# Patient Record
Sex: MALE | Race: WHITE | NOT HISPANIC OR LATINO | URBAN - METROPOLITAN AREA
[De-identification: names, ages, dates, MRNs, and addresses within clinical notes are randomized per-mention and may not be internally consistent; named-entity substitution may affect disease eponyms.]

---

## 2024-04-15 ENCOUNTER — APPOINTMENT (OUTPATIENT)
Dept: LAB | Age: 29
End: 2024-04-15
Payer: MEDICARE

## 2024-04-15 ENCOUNTER — OFFICE VISIT (OUTPATIENT)
Dept: FAMILY MEDICINE CLINIC | Facility: CLINIC | Age: 29
End: 2024-04-15
Payer: MEDICARE

## 2024-04-15 VITALS
BODY MASS INDEX: 39.14 KG/M2 | HEART RATE: 106 BPM | SYSTOLIC BLOOD PRESSURE: 118 MMHG | RESPIRATION RATE: 16 BRPM | WEIGHT: 273.4 LBS | DIASTOLIC BLOOD PRESSURE: 84 MMHG | HEIGHT: 70 IN | OXYGEN SATURATION: 97 % | TEMPERATURE: 98.5 F

## 2024-04-15 DIAGNOSIS — F90.9 ADULT ADHD (ATTENTION DEFICIT HYPERACTIVITY DISORDER): Primary | ICD-10-CM

## 2024-04-15 DIAGNOSIS — Z00.00 HEALTHCARE MAINTENANCE: ICD-10-CM

## 2024-04-15 DIAGNOSIS — R73.9 HYPERGLYCEMIA: ICD-10-CM

## 2024-04-15 DIAGNOSIS — E78.49 OTHER HYPERLIPIDEMIA: ICD-10-CM

## 2024-04-15 LAB
ALBUMIN SERPL BCP-MCNC: 4.3 G/DL (ref 3.5–5)
ALP SERPL-CCNC: 63 U/L (ref 34–104)
ALT SERPL W P-5'-P-CCNC: 28 U/L (ref 7–52)
ANION GAP SERPL CALCULATED.3IONS-SCNC: 10 MMOL/L (ref 4–13)
AST SERPL W P-5'-P-CCNC: 18 U/L (ref 13–39)
BASOPHILS # BLD AUTO: 0.07 THOUSANDS/ÂΜL (ref 0–0.1)
BASOPHILS NFR BLD AUTO: 1 % (ref 0–1)
BILIRUB SERPL-MCNC: 0.45 MG/DL (ref 0.2–1)
BUN SERPL-MCNC: 13 MG/DL (ref 5–25)
CALCIUM SERPL-MCNC: 9.3 MG/DL (ref 8.4–10.2)
CHLORIDE SERPL-SCNC: 104 MMOL/L (ref 96–108)
CHOLEST SERPL-MCNC: 213 MG/DL
CO2 SERPL-SCNC: 26 MMOL/L (ref 21–32)
CREAT SERPL-MCNC: 0.98 MG/DL (ref 0.6–1.3)
EOSINOPHIL # BLD AUTO: 0.3 THOUSAND/ÂΜL (ref 0–0.61)
EOSINOPHIL NFR BLD AUTO: 3 % (ref 0–6)
ERYTHROCYTE [DISTWIDTH] IN BLOOD BY AUTOMATED COUNT: 12.7 % (ref 11.6–15.1)
EST. AVERAGE GLUCOSE BLD GHB EST-MCNC: 131 MG/DL
GFR SERPL CREATININE-BSD FRML MDRD: 103 ML/MIN/1.73SQ M
GLUCOSE P FAST SERPL-MCNC: 88 MG/DL (ref 65–99)
HBA1C MFR BLD: 6.2 %
HCT VFR BLD AUTO: 45.8 % (ref 36.5–49.3)
HDLC SERPL-MCNC: 42 MG/DL
HGB BLD-MCNC: 15.1 G/DL (ref 12–17)
IMM GRANULOCYTES # BLD AUTO: 0.03 THOUSAND/UL (ref 0–0.2)
IMM GRANULOCYTES NFR BLD AUTO: 0 % (ref 0–2)
LDLC SERPL CALC-MCNC: 148 MG/DL (ref 0–100)
LYMPHOCYTES # BLD AUTO: 2.39 THOUSANDS/ÂΜL (ref 0.6–4.47)
LYMPHOCYTES NFR BLD AUTO: 27 % (ref 14–44)
MCH RBC QN AUTO: 29.4 PG (ref 26.8–34.3)
MCHC RBC AUTO-ENTMCNC: 33 G/DL (ref 31.4–37.4)
MCV RBC AUTO: 89 FL (ref 82–98)
MONOCYTES # BLD AUTO: 0.61 THOUSAND/ÂΜL (ref 0.17–1.22)
MONOCYTES NFR BLD AUTO: 7 % (ref 4–12)
NEUTROPHILS # BLD AUTO: 5.32 THOUSANDS/ÂΜL (ref 1.85–7.62)
NEUTS SEG NFR BLD AUTO: 62 % (ref 43–75)
NRBC BLD AUTO-RTO: 0 /100 WBCS
PLATELET # BLD AUTO: 308 THOUSANDS/UL (ref 149–390)
PMV BLD AUTO: 11.8 FL (ref 8.9–12.7)
POTASSIUM SERPL-SCNC: 3.8 MMOL/L (ref 3.5–5.3)
PROT SERPL-MCNC: 7.3 G/DL (ref 6.4–8.4)
RBC # BLD AUTO: 5.13 MILLION/UL (ref 3.88–5.62)
SODIUM SERPL-SCNC: 140 MMOL/L (ref 135–147)
T4 FREE SERPL-MCNC: 0.66 NG/DL (ref 0.61–1.12)
TRIGL SERPL-MCNC: 115 MG/DL
TSH SERPL DL<=0.05 MIU/L-ACNC: 6.99 UIU/ML (ref 0.45–4.5)
WBC # BLD AUTO: 8.72 THOUSAND/UL (ref 4.31–10.16)

## 2024-04-15 PROCEDURE — 84443 ASSAY THYROID STIM HORMONE: CPT

## 2024-04-15 PROCEDURE — 80053 COMPREHEN METABOLIC PANEL: CPT

## 2024-04-15 PROCEDURE — 80061 LIPID PANEL: CPT

## 2024-04-15 PROCEDURE — 99204 OFFICE O/P NEW MOD 45 MIN: CPT | Performed by: FAMILY MEDICINE

## 2024-04-15 PROCEDURE — 85025 COMPLETE CBC W/AUTO DIFF WBC: CPT

## 2024-04-15 PROCEDURE — 83036 HEMOGLOBIN GLYCOSYLATED A1C: CPT

## 2024-04-15 PROCEDURE — 36415 COLL VENOUS BLD VENIPUNCTURE: CPT

## 2024-04-15 PROCEDURE — 84439 ASSAY OF FREE THYROXINE: CPT

## 2024-04-15 PROCEDURE — 99395 PREV VISIT EST AGE 18-39: CPT | Performed by: FAMILY MEDICINE

## 2024-04-15 RX ORDER — DEXTROAMPHETAMINE SACCHARATE, AMPHETAMINE ASPARTATE, DEXTROAMPHETAMINE SULFATE AND AMPHETAMINE SULFATE 3.75; 3.75; 3.75; 3.75 MG/1; MG/1; MG/1; MG/1
15 TABLET ORAL DAILY
Qty: 30 TABLET | Refills: 0 | Status: SHIPPED | OUTPATIENT
Start: 2024-04-15

## 2024-04-15 RX ORDER — DEXTROAMPHETAMINE SACCHARATE, AMPHETAMINE ASPARTATE, DEXTROAMPHETAMINE SULFATE AND AMPHETAMINE SULFATE 3.75; 3.75; 3.75; 3.75 MG/1; MG/1; MG/1; MG/1
15 TABLET ORAL DAILY
COMMUNITY
Start: 2024-02-23 | End: 2024-04-15 | Stop reason: SDUPTHER

## 2024-04-15 NOTE — ASSESSMENT & PLAN NOTE
He has history of hyperlipidemia from his previous doctor.  It was discussed about low-fat diet medical exercise.  I am going to check his fasting lipid profile.  Come back and 6 to 8 weeks for follow-up.

## 2024-04-15 NOTE — PROGRESS NOTES
Name: Jesus Maria      : 1995      MRN: [id number]  Encounter Provider: Yoni Ingram MD  Encounter Date: 4/15/2024   Encounter department: [facility] [address] PRIMARY [facility]    Assessment & Plan     1. Adult ADHD (attention deficit hyperactivity disorder)  Assessment & Plan:  Stable on Adderall 50 mg daily.  He was given refill.  Come back in 6 to 8 weeks for follow-up.    Orders:  -     amphetamine-dextroamphetamine (ADDERALL) 15 MG tablet; Take 1 tablet (15 mg total) by mouth daily Max Daily Amount: 15 mg    2. Other hyperlipidemia  Assessment & Plan:  He has history of hyperlipidemia from his previous doctor.  It was discussed about low-fat diet medical exercise.  I am going to check his fasting lipid profile.  Come back and 6 to 8 weeks for follow-up.      3. Healthcare maintenance  Assessment & Plan:  It was discussed about immunizations, diet, exercise and safety measures.    Orders:  -     CBC and differential; Future  -     Comprehensive metabolic panel; Future  -     Lipid Panel with Direct LDL reflex; Future  -     TSH, 3rd generation with Free T4 reflex; Future  -     Hemoglobin A1C; Future    4. Hyperglycemia  Assessment & Plan:  Discussed about low-carb diet and regular exercise.  I am going to check his fasting glucose and A1c.             Subjective     Here today as a new patient to establish and follow-up multiple medical problems.  He has history of ADHD and he is on Adderall 50 mg daily.  He just moved up here from [place].  He also was told by his previous doctor that he has high cholesterol and slightly elevated high sugar.  He has been trying to watch his diet.  He denies any complaint today.      Review of Systems   Constitutional:  Negative for chills and fever.   HENT:  Negative for trouble swallowing.    Eyes:  Negative for visual disturbance.   Respiratory:  Negative for cough and shortness of breath.    Cardiovascular:  Negative for chest pain and palpitations.    Gastrointestinal:  Negative for abdominal pain, blood in stool and vomiting.   Endocrine: Negative for cold intolerance and heat intolerance.   Genitourinary:  Negative for difficulty urinating and dysuria.   Musculoskeletal:  Negative for gait problem.   Skin:  Negative for rash.   Neurological:  Negative for dizziness, syncope and headaches.   Hematological:  Negative for adenopathy.   Psychiatric/Behavioral:  Negative for behavioral problems.        Past Medical History:   Diagnosis Date   • ADHD (attention deficit hyperactivity disorder)      History reviewed. No pertinent surgical history.  Family History   Problem Relation Age of Onset   • Colon cancer Maternal Grandfather    • Colon cancer Cousin      Social History     Socioeconomic History   • Marital status: Single     Spouse name: None   • Number of children: None   • Years of education: None   • Highest education level: None   Occupational History   • None   Tobacco Use   • Smoking status: Never     Passive exposure: Never   • Smokeless tobacco: Never   Vaping Use   • Vaping status: Never Used   Substance and Sexual Activity   • Alcohol use: Never   • Drug use: None   • Sexual activity: None   Other Topics Concern   • None   Social History Narrative   • None     Social Determinants of Health     Financial Resource Strain: Not on file   Food Insecurity: Not on file   Transportation Needs: Not on file   Physical Activity: Not on file   Stress: Not on file   Social Connections: Not on file   Intimate Partner Violence: Not on file   Housing Stability: Not on file     Current Outpatient Medications on File Prior to Visit   Medication Sig   • [DISCONTINUED] amphetamine-dextroamphetamine (ADDERALL) 15 MG tablet Take 15 mg by mouth daily     No Known Allergies    There is no immunization history on file for this patient.    Objective     /84 (BP Location: Left arm, Patient Position: Sitting, Cuff Size: Large)   Pulse (!) 106   Temp 98.5 °F (36.9 °C)  "(Tympanic)   Resp 16   Ht 5' 10\" (1.778 m)   Wt 124 kg (273 lb 6.4 oz)   SpO2 97%   BMI 39.23 kg/m²     Physical Exam  Vitals and nursing note reviewed.   Constitutional:       Appearance: He is well-developed.   HENT:      Head: Normocephalic and atraumatic.   Eyes:      Pupils: Pupils are equal, round, and reactive to light.   Cardiovascular:      Rate and Rhythm: Normal rate and regular rhythm.      Heart sounds: Normal heart sounds.   Pulmonary:      Effort: Pulmonary effort is normal.      Breath sounds: Normal breath sounds.   Abdominal:      General: Bowel sounds are normal.      Palpations: Abdomen is soft.   Musculoskeletal:      Cervical back: Normal range of motion and neck supple.   Lymphadenopathy:      Cervical: No cervical adenopathy.   Skin:     General: Skin is warm.      Findings: No rash.   Neurological:      Mental Status: He is alert and oriented to person, place, and time.       Yoni Ingram MD    "

## 2024-04-15 NOTE — ASSESSMENT & PLAN NOTE
Discussed about low-carb diet and regular exercise.  I am going to check his fasting glucose and A1c.

## 2024-04-17 ENCOUNTER — TELEPHONE (OUTPATIENT)
Age: 29
End: 2024-04-17

## 2024-04-17 NOTE — TELEPHONE ENCOUNTER
Patient's grandmother Yoanna called into office this morning inquiring about PCP listed on insurance card, needs to update as it is currently listed as [facility]. States when she contacted [facility] to switch providers they informed her that Dr. Ingram is not a PCP. Informed Yoanna that she is correct and Dr. Ingram is a primary care provider and provided both providers' NPI numbers to give to [facility] to update insurance card. Yoanna will attempt to reach out to insurance again and will call office back with update.

## 2024-04-23 NOTE — TELEPHONE ENCOUNTER
His blood work came back showing elevated A1c at 6.2 this is a prediabetes.  Also his LDL came back elevated.  His TSH came back elevated but his free T4 is normal.  I will discuss further in his next appointment.

## 2024-05-15 PROBLEM — Z00.00 HEALTHCARE MAINTENANCE: Status: RESOLVED | Noted: 2024-04-15 | Resolved: 2024-05-15

## 2024-05-29 ENCOUNTER — OFFICE VISIT (OUTPATIENT)
Dept: FAMILY MEDICINE CLINIC | Facility: CLINIC | Age: 29
End: 2024-05-29
Payer: MEDICARE

## 2024-05-29 VITALS
RESPIRATION RATE: 16 BRPM | TEMPERATURE: 97.3 F | HEIGHT: 70 IN | BODY MASS INDEX: 39.22 KG/M2 | OXYGEN SATURATION: 98 % | SYSTOLIC BLOOD PRESSURE: 118 MMHG | HEART RATE: 112 BPM | DIASTOLIC BLOOD PRESSURE: 70 MMHG | WEIGHT: 274 LBS

## 2024-05-29 DIAGNOSIS — Z00.00 MEDICARE ANNUAL WELLNESS VISIT, SUBSEQUENT: ICD-10-CM

## 2024-05-29 DIAGNOSIS — E03.8 OTHER SPECIFIED HYPOTHYROIDISM: ICD-10-CM

## 2024-05-29 DIAGNOSIS — Z11.59 ENCOUNTER FOR HEPATITIS C SCREENING TEST FOR LOW RISK PATIENT: ICD-10-CM

## 2024-05-29 DIAGNOSIS — F90.9 ADULT ADHD (ATTENTION DEFICIT HYPERACTIVITY DISORDER): ICD-10-CM

## 2024-05-29 DIAGNOSIS — R73.9 HYPERGLYCEMIA: Primary | ICD-10-CM

## 2024-05-29 DIAGNOSIS — E78.49 OTHER HYPERLIPIDEMIA: ICD-10-CM

## 2024-05-29 DIAGNOSIS — Z11.4 SCREENING FOR HIV (HUMAN IMMUNODEFICIENCY VIRUS): ICD-10-CM

## 2024-05-29 PROCEDURE — G0444 DEPRESSION SCREEN ANNUAL: HCPCS | Performed by: FAMILY MEDICINE

## 2024-05-29 PROCEDURE — G0439 PPPS, SUBSEQ VISIT: HCPCS | Performed by: FAMILY MEDICINE

## 2024-05-29 PROCEDURE — 99214 OFFICE O/P EST MOD 30 MIN: CPT | Performed by: FAMILY MEDICINE

## 2024-05-29 RX ORDER — LEVOTHYROXINE SODIUM 0.03 MG/1
25 TABLET ORAL DAILY
Qty: 30 TABLET | Refills: 2 | Status: SHIPPED | OUTPATIENT
Start: 2024-05-29

## 2024-05-29 RX ORDER — DEXTROAMPHETAMINE SACCHARATE, AMPHETAMINE ASPARTATE, DEXTROAMPHETAMINE SULFATE AND AMPHETAMINE SULFATE 3.75; 3.75; 3.75; 3.75 MG/1; MG/1; MG/1; MG/1
15 TABLET ORAL DAILY
Qty: 30 TABLET | Refills: 0 | Status: SHIPPED | OUTPATIENT
Start: 2024-05-29

## 2024-05-29 NOTE — PROGRESS NOTES
Ambulatory Visit  Name: Jesus Maria      : 1995      MRN: [id number]  Encounter Provider: Yoni Ingram MD  Encounter Date: 2024   [facility]    Assessment & Plan   1. Hyperglycemia  Assessment & Plan:  It was discussed about low-carb diet and regular exercise.  His A1c was elevated at 6.2.  Discussed with possible using GLP-1 to help with his weight and his A1c but he wants to wait and follow diet and exercise.  We will repeat in 3 months.  Orders:  -     Hemoglobin A1C; Future  2. Other hyperlipidemia  Assessment & Plan:  Not well-controlled.  It was discussed about low-fat diet and regular exercise.  I am going to repeat his fasting lipids.  Improving I will consider statin.  Orders:  -     CBC and differential; Future  -     Comprehensive metabolic panel; Future  -     Lipid Panel with Direct LDL reflex; Future  -     TSH, 3rd generation with Free T4 reflex; Future  3. Adult ADHD (attention deficit hyperactivity disorder)  Assessment & Plan:  Well-controlled on Adderall 15 mg daily.  Continue same.  Will continue to monitor.  Come back in 3 months.  Orders:  -     amphetamine-dextroamphetamine (ADDERALL) 15 MG tablet; Take 1 tablet (15 mg total) by mouth daily Max Daily Amount: 15 mg  4. Other specified hypothyroidism  Assessment & Plan:  His TSH was elevated but free T4 is normal.  Patient is symptomatic was having problems losing weight and feeling fatigued.  I am going to start him on levothyroxine 25 mcg daily.  Discussed with possible side effects.  I will repeat the blood work in 3 months.  Orders:  -     levothyroxine (Synthroid) 25 mcg tablet; Take 1 tablet (25 mcg total) by mouth daily  -     TSH, 3rd generation with Free T4 reflex; Future  5. Medicare annual wellness visit, subsequent  Assessment & Plan:  It was discussed about immunizations, diet, exercise and safety measures.  6. Screening for HIV (human immunodeficiency virus)  7.  Encounter for hepatitis C screening test for low risk patient  -     Hepatitis C antibody; Future       Preventive health issues were discussed with patient, and age appropriate screening tests were ordered as noted in patient's After Visit Summary. Personalized health advice and appropriate referrals for health education or preventive services given if needed, as noted in patient's After Visit Summary.    History of Present Illness     Patient is here today for follow-up multiple medical problems.  He has been doing well on Adderall 15 mg daily.  He had blood work done recently showed elevated A1c at 6.2.  His cholesterol came back elevated.  His TSH came back elevated but his free T4 came back normal.  He has been trying to lose weight and has been difficult.       Patient Care Team:  Yoni Ingram MD as PCP - General (Family Medicine)    Review of Systems   Constitutional:  Negative for chills and fever.   HENT:  Negative for trouble swallowing.    Eyes:  Negative for visual disturbance.   Respiratory:  Negative for cough and shortness of breath.    Cardiovascular:  Negative for chest pain and palpitations.   Gastrointestinal:  Negative for abdominal pain, blood in stool and vomiting.   Endocrine: Negative for cold intolerance and heat intolerance.   Genitourinary:  Negative for difficulty urinating and dysuria.   Musculoskeletal:  Negative for gait problem.   Skin:  Negative for rash.   Neurological:  Negative for dizziness, syncope and headaches.   Hematological:  Negative for adenopathy.   Psychiatric/Behavioral:  Negative for behavioral problems.      Medical History Reviewed by provider this encounter:       Annual Wellness Visit Questionnaire   Jesus is here for his Subsequent Wellness visit.     Health Risk Assessment:   Patient rates overall health as very good. Patient feels that their physical health rating is much better. Patient is very satisfied with their life. Eyesight was rated as same. Hearing  was rated as same. Patient feels that their emotional and mental health rating is much better. Patients states they are never, rarely angry. Patient states they are never, rarely unusually tired/fatigued. Pain experienced in the last 7 days has been some. Patient's pain rating has been 1/10. Patient states that he has experienced weight loss or gain in last 6 months.     Depression Screening:   PHQ-2 Score: 0      Fall Risk Screening:   In the past year, patient has experienced: no history of falling in past year      Home Safety:  Patient does not have trouble with stairs inside or outside of their home. Patient has working smoke alarms and has working carbon monoxide detector. Home safety hazards include: none.     Nutrition:   Current diet is Low Carb.     Medications:   Patient is not currently taking any over-the-counter supplements. Patient is able to manage medications.     Activities of Daily Living (ADLs)/Instrumental Activities of Daily Living (IADLs):   Walk and transfer into and out of bed and chair?: Yes  Dress and groom yourself?: Yes    Bathe or shower yourself?: Yes    Feed yourself? Yes  Do your laundry/housekeeping?: Yes  Manage your money, pay your bills and track your expenses?: Yes  Make your own meals?: Yes    Do your own shopping?: Yes    Previous Hospitalizations:   Any hospitalizations or ED visits within the last 12 months?: No      Advance Care Planning:   Living will: No    Durable POA for healthcare: No    Advanced directive: No      Cognitive Screening:   Provider or family/friend/caregiver concerned regarding cognition?: No    PREVENTIVE SCREENINGS      Cardiovascular Screening:    General: Screening Not Indicated and History Lipid Disorder      Diabetes Screening:     General: Screening Current      Colorectal Cancer Screening:     General: Screening Not Indicated      Prostate Cancer Screening:    General: Screening Not Indicated      Osteoporosis Screening:    General: Screening Not  "Indicated      Abdominal Aortic Aneurysm (AAA) Screening:        General: Screening Not Indicated      Lung Cancer Screening:     General: Screening Not Indicated      Hepatitis C Screening:    General: Risks and Benefits Discussed    Screening, Brief Intervention, and Referral to Treatment (SBIRT)    Screening  Typical number of drinks in a day: 0  Typical number of drinks in a week: 0  Interpretation: Low risk drinking behavior.    Single Item Drug Screening:  How often have you used an illegal drug (including marijuana) or a prescription medication for non-medical reasons in the past year? never    Single Item Drug Screen Score: 0  Interpretation: Negative screen for possible drug use disorder    Brief Intervention  Alcohol & drug use screenings were reviewed. No concerns regarding substance use disorder identified.     Annual Depression Screening  Time spent screening and evaluating the patient for depression during today's encounter was 7 minutes.    Other Counseling Topics:   Calcium and vitamin D intake.        No results found.    Objective     /70 (BP Location: Left arm, Patient Position: Sitting, Cuff Size: Large)   Pulse (!) 112   Temp (!) 97.3 °F (36.3 °C) (Tympanic)   Resp 16   Ht 5' 10\" (1.778 m)   Wt 124 kg (274 lb)   SpO2 98%   BMI 39.31 kg/m²     Physical Exam  Vitals and nursing note reviewed.   Constitutional:       Appearance: He is well-developed.   HENT:      Head: Normocephalic and atraumatic.   Eyes:      Pupils: Pupils are equal, round, and reactive to light.   Cardiovascular:      Rate and Rhythm: Normal rate and regular rhythm.      Heart sounds: Normal heart sounds.   Pulmonary:      Effort: Pulmonary effort is normal.      Breath sounds: Normal breath sounds.   Abdominal:      General: Bowel sounds are normal.      Palpations: Abdomen is soft.   Musculoskeletal:      Cervical back: Normal range of motion and neck supple.   Lymphadenopathy:      Cervical: No cervical " adenopathy.   Skin:     General: Skin is warm.      Findings: No rash.   Neurological:      Mental Status: He is alert and oriented to person, place, and time.       Administrative Statements

## 2024-06-02 PROBLEM — E03.8 OTHER SPECIFIED HYPOTHYROIDISM: Status: ACTIVE | Noted: 2024-06-02

## 2024-06-02 NOTE — ASSESSMENT & PLAN NOTE
Not well-controlled.  It was discussed about low-fat diet and regular exercise.  I am going to repeat his fasting lipids.  Improving I will consider statin.

## 2024-06-02 NOTE — ASSESSMENT & PLAN NOTE
Well-controlled on Adderall 15 mg daily.  Continue same.  Will continue to monitor.  Come back in 3 months.

## 2024-06-02 NOTE — ASSESSMENT & PLAN NOTE
His TSH was elevated but free T4 is normal.  Patient is symptomatic was having problems losing weight and feeling fatigued.  I am going to start him on levothyroxine 25 mcg daily.  Discussed with possible side effects.  I will repeat the blood work in 3 months.

## 2024-06-02 NOTE — ASSESSMENT & PLAN NOTE
It was discussed about low-carb diet and regular exercise.  His A1c was elevated at 6.2.  Discussed with possible using GLP-1 to help with his weight and his A1c but he wants to wait and follow diet and exercise.  We will repeat in 3 months.

## 2024-06-24 DIAGNOSIS — E03.8 OTHER SPECIFIED HYPOTHYROIDISM: ICD-10-CM

## 2024-06-24 DIAGNOSIS — F90.9 ADULT ADHD (ATTENTION DEFICIT HYPERACTIVITY DISORDER): ICD-10-CM

## 2024-06-24 RX ORDER — LEVOTHYROXINE SODIUM 0.03 MG/1
25 TABLET ORAL DAILY
Qty: 90 TABLET | Refills: 1 | Status: SHIPPED | OUTPATIENT
Start: 2024-06-24

## 2024-06-24 NOTE — TELEPHONE ENCOUNTER
Reason for call:   [x] Refill   [] Prior Auth  [] Other:     Office:   [x] PCP/Provider -Dr. Ingram   [] Specialty/Provider -     Medication: amphetamine-dextroamphetamine (ADDERALL) 15 MG tablet /Take 1 tablet (15 mg total) by mouth daily   levothyroxine (Synthroid) 25 mcg tablet /Take 1 tablet (25 mcg total) by mouth daily       Quantity: 30/90    Pharmacy: [facility]    Does the patient have enough for 3 days?   [x] Yes   [] No - Send as HP to POD

## 2024-06-25 RX ORDER — DEXTROAMPHETAMINE SACCHARATE, AMPHETAMINE ASPARTATE, DEXTROAMPHETAMINE SULFATE AND AMPHETAMINE SULFATE 3.75; 3.75; 3.75; 3.75 MG/1; MG/1; MG/1; MG/1
15 TABLET ORAL DAILY
Qty: 30 TABLET | Refills: 0 | Status: SHIPPED | OUTPATIENT
Start: 2024-06-25

## 2024-06-25 NOTE — TELEPHONE ENCOUNTER
Last seen 5/29/24  Has appt 8/30/24     1 HASMUKH [place] 1995 M [id number] [facility]   2 HASMUKH [place] 1995 M [address] [facility] [facility]      Search Criteria  NAME DATE OF BIRTH DATE RANGE  Hasmukh Maria 1995 06- To 06-  REQUESTER NAME REQUESTED DATE  JENNIFER [facility] 06- 11:03:39 ([facility])  Summary  Prescriptions  4  Prescribers  3  Pharmacies  1  Drug Classes  Benzodiazepines  0  Stimulants  4  Opioids  0  Muscle Relaxants  0  Opioid Dosage  Total MME for Active Prescriptions  0    Average MME  0.00         Prescriptions  Notifications    Prescribers  Pharmacies  MME Graph    Show All     [place]   Drug Categories:      Stimulants     Show  10  entries  Search:  PATIENT ID PRESCRIPTION # FILLED WRITTEN DRUG LABEL QTY DAYS STRENGTH MME** PRESCRIBER PHARMACY PAYMENT REFILL #/AUTH STATE DETAIL  1 [id number] 05/29/2024 05/29/2024 Amphetamine Salt Combo (Tablet) 30.0 30 15 MG NA CHON) [id number] [place] 0 / 0 [place]   2 [id number] 04/26/2024 04/15/2024 Amphetamine Salt Combo (Tablet) 30.0 30 15 MG NA CHON) [id number] [place] 0 / 0 [place]   2 [id number] 02/23/2024 02/23/2024 Amphetamine Salt Combo (Tablet) 30.0 30 15 MG NA NATASHA SALEEM [id number] [place] 0 / 0 [place]   2 [id number] 12/22/2023 12/22/2023 Amphetamine Salt Combo (Tablet) 30.0 30 [facility]

## 2024-06-25 NOTE — TELEPHONE ENCOUNTER
Patients mother called to request the status of a refill for their  advised a refill was requested on 06/24/24 and is pending approval. Patients mother verbalized understanding and is in agreement.

## 2024-07-02 PROBLEM — Z00.00 MEDICARE ANNUAL WELLNESS VISIT, SUBSEQUENT: Status: RESOLVED | Noted: 2024-04-15 | Resolved: 2024-07-02

## 2024-07-29 DIAGNOSIS — F90.9 ADULT ADHD (ATTENTION DEFICIT HYPERACTIVITY DISORDER): ICD-10-CM

## 2024-07-29 NOTE — TELEPHONE ENCOUNTER
Reason for call:   [x] Refill   [] Prior Auth  [] Other:     Office:   [] PCP/Provider -   [] Specialty/Provider -     Medication: amphetamine-dextroamphetamine (ADDERALL) 15 MG tablet     Dose/Frequency: Take 1 tablet (15 mg total) by mouth daily     Quantity: 30    Pharmacy: [facility]      Does the patient have enough for 3 days?   [x] Yes   [] No - Send as HP to POD

## 2024-07-31 NOTE — TELEPHONE ENCOUNTER
Last seen 5/29/24     1 [facility] 1995 M [id number] [place]   2 [facility] 1995 M [id number] [place]   3 [facility] 1995 M [address] [facility] [id number] [facility]      Search Criteria  Name Date of Birth Date Range  [facility] 1995 08- To 07-  Requester Name [facility]ed Date  [facility]  Summary  Prescriptions  5  Prescribers  3  Pharmacies  2  Drug Classes  Benzodiazepines  0  Stimulants  5  Opioids  0  Muscle Relaxants  0  Opioid Dosage  Total MME for Active Prescriptions  0    Average MME  0.00         Prescriptions  Notifications    Prescribers  Pharmacies  MME Graph    Show All     PA   Drug Categories:      Stimulants     Show  10  entries  Search:  Patient Id Prescription # Filled Written Drug Label Qty Days Strength MME** Prescriber Pharmacy Payment REFILL #/Auth State Detail  1 [id number] 06/29/2024 06/25/2024 Amphetamine Salt Combo (Tablet) 30.0 30 15 MG NA CHON) [facility] [place] 0 / 0 [place]   2 [id number] 05/29/2024 05/29/2024 Amphetamine Salt Combo (Tablet) 30.0 30 15 MG NA CHON) [facility][id number] [place] 0 / 0 [place]   3 [id number] 04/26/2024 04/15/2024 Amphetamine Salt Combo (Tablet) 30.0 30 15 MG NA CHON) [facility]6243 [place] 0 / 0 [place]   3 [id number] 02/23/2024 02/23/2024 Amphetamine Salt Combo (Tablet) 30.0 30 15 MG NA NATASHA SALEEM [facility][id number] [place] 0 / 0 [place]   3 [id number] 12/22/2023 12/22/2023 Amphetamine Salt Combo (Tablet) 30.0 30 15 [facility]

## 2024-08-01 RX ORDER — DEXTROAMPHETAMINE SACCHARATE, AMPHETAMINE ASPARTATE, DEXTROAMPHETAMINE SULFATE AND AMPHETAMINE SULFATE 3.75; 3.75; 3.75; 3.75 MG/1; MG/1; MG/1; MG/1
15 TABLET ORAL DAILY
Qty: 30 TABLET | Refills: 0 | Status: SHIPPED | OUTPATIENT
Start: 2024-08-01

## 2024-08-19 ENCOUNTER — RA CDI HCC (OUTPATIENT)
Dept: OTHER | Facility: HOSPITAL | Age: 29
End: 2024-08-19

## 2024-09-06 ENCOUNTER — APPOINTMENT (OUTPATIENT)
Dept: LAB | Age: 29
End: 2024-09-06
Payer: MEDICARE

## 2024-09-06 DIAGNOSIS — E78.49 OTHER HYPERLIPIDEMIA: ICD-10-CM

## 2024-09-06 DIAGNOSIS — R73.9 HYPERGLYCEMIA: ICD-10-CM

## 2024-09-06 DIAGNOSIS — E03.8 OTHER SPECIFIED HYPOTHYROIDISM: ICD-10-CM

## 2024-09-06 LAB
ALBUMIN SERPL BCG-MCNC: 4.3 G/DL (ref 3.5–5)
ALP SERPL-CCNC: 65 U/L (ref 34–104)
ALT SERPL W P-5'-P-CCNC: 32 U/L (ref 7–52)
ANION GAP SERPL CALCULATED.3IONS-SCNC: 10 MMOL/L (ref 4–13)
AST SERPL W P-5'-P-CCNC: 15 U/L (ref 13–39)
BASOPHILS # BLD AUTO: 0.05 THOUSANDS/ÂΜL (ref 0–0.1)
BASOPHILS NFR BLD AUTO: 1 % (ref 0–1)
BILIRUB SERPL-MCNC: 0.38 MG/DL (ref 0.2–1)
BUN SERPL-MCNC: 15 MG/DL (ref 5–25)
CALCIUM SERPL-MCNC: 9.7 MG/DL (ref 8.4–10.2)
CHLORIDE SERPL-SCNC: 102 MMOL/L (ref 96–108)
CHOLEST SERPL-MCNC: 211 MG/DL
CO2 SERPL-SCNC: 28 MMOL/L (ref 21–32)
CREAT SERPL-MCNC: 0.92 MG/DL (ref 0.6–1.3)
EOSINOPHIL # BLD AUTO: 0.42 THOUSAND/ÂΜL (ref 0–0.61)
EOSINOPHIL NFR BLD AUTO: 5 % (ref 0–6)
ERYTHROCYTE [DISTWIDTH] IN BLOOD BY AUTOMATED COUNT: 13 % (ref 11.6–15.1)
EST. AVERAGE GLUCOSE BLD GHB EST-MCNC: 137 MG/DL
GFR SERPL CREATININE-BSD FRML MDRD: 111 ML/MIN/1.73SQ M
GLUCOSE P FAST SERPL-MCNC: 108 MG/DL (ref 65–99)
HBA1C MFR BLD: 6.4 %
HCT VFR BLD AUTO: 46.3 % (ref 36.5–49.3)
HDLC SERPL-MCNC: 45 MG/DL
HGB BLD-MCNC: 15.1 G/DL (ref 12–17)
IMM GRANULOCYTES # BLD AUTO: 0.03 THOUSAND/UL (ref 0–0.2)
IMM GRANULOCYTES NFR BLD AUTO: 0 % (ref 0–2)
LDLC SERPL CALC-MCNC: 139 MG/DL (ref 0–100)
LYMPHOCYTES # BLD AUTO: 2.21 THOUSANDS/ÂΜL (ref 0.6–4.47)
LYMPHOCYTES NFR BLD AUTO: 28 % (ref 14–44)
MCH RBC QN AUTO: 29 PG (ref 26.8–34.3)
MCHC RBC AUTO-ENTMCNC: 32.6 G/DL (ref 31.4–37.4)
MCV RBC AUTO: 89 FL (ref 82–98)
MONOCYTES # BLD AUTO: 0.63 THOUSAND/ÂΜL (ref 0.17–1.22)
MONOCYTES NFR BLD AUTO: 8 % (ref 4–12)
NEUTROPHILS # BLD AUTO: 4.45 THOUSANDS/ÂΜL (ref 1.85–7.62)
NEUTS SEG NFR BLD AUTO: 58 % (ref 43–75)
NRBC BLD AUTO-RTO: 0 /100 WBCS
PLATELET # BLD AUTO: 265 THOUSANDS/UL (ref 149–390)
PMV BLD AUTO: 11.7 FL (ref 8.9–12.7)
POTASSIUM SERPL-SCNC: 4.4 MMOL/L (ref 3.5–5.3)
PROT SERPL-MCNC: 6.9 G/DL (ref 6.4–8.4)
RBC # BLD AUTO: 5.21 MILLION/UL (ref 3.88–5.62)
SODIUM SERPL-SCNC: 140 MMOL/L (ref 135–147)
T4 FREE SERPL-MCNC: 0.79 NG/DL (ref 0.61–1.12)
TRIGL SERPL-MCNC: 137 MG/DL
TSH SERPL DL<=0.05 MIU/L-ACNC: 8.23 UIU/ML (ref 0.45–4.5)
WBC # BLD AUTO: 7.79 THOUSAND/UL (ref 4.31–10.16)

## 2024-09-06 PROCEDURE — 36415 COLL VENOUS BLD VENIPUNCTURE: CPT

## 2024-09-06 PROCEDURE — 80053 COMPREHEN METABOLIC PANEL: CPT

## 2024-09-06 PROCEDURE — 83036 HEMOGLOBIN GLYCOSYLATED A1C: CPT

## 2024-09-06 PROCEDURE — 84443 ASSAY THYROID STIM HORMONE: CPT

## 2024-09-06 PROCEDURE — 80061 LIPID PANEL: CPT

## 2024-09-06 PROCEDURE — 84439 ASSAY OF FREE THYROXINE: CPT

## 2024-09-06 PROCEDURE — 85025 COMPLETE CBC W/AUTO DIFF WBC: CPT

## 2024-09-18 DIAGNOSIS — F90.9 ADULT ADHD (ATTENTION DEFICIT HYPERACTIVITY DISORDER): ICD-10-CM

## 2024-09-18 NOTE — TELEPHONE ENCOUNTER
Medication: Adderall    Dose/Frequency: 15mg OD    Quantity: 30    Pharmacy: [facility]ah nick    Office:   [x] PCP/Provider -   [] Speciality/Provider -     Does the patient have enough for 3 days?   [x] Yes   [] No - Send as HP to POD    Pt has appt scheduled for 10/16 -only have 5 pills remaining.

## 2024-09-19 RX ORDER — DEXTROAMPHETAMINE SACCHARATE, AMPHETAMINE ASPARTATE, DEXTROAMPHETAMINE SULFATE AND AMPHETAMINE SULFATE 3.75; 3.75; 3.75; 3.75 MG/1; MG/1; MG/1; MG/1
15 TABLET ORAL DAILY
Qty: 30 TABLET | Refills: 0 | Status: SHIPPED | OUTPATIENT
Start: 2024-09-19

## 2024-09-19 NOTE — TELEPHONE ENCOUNTER
Last seen 5/29/24  Has appt 10/16/24     1 [facility] 1995 M [id number] [place]   2 [facility] 1995 M [id number] [place]   3 [facility] 1995 M [address] [facility] [id number] [facility]      Search Criteria  Name Date of Birth Date Range  [facility] 1995 09- To 09-  Requester Name Requested Date  JENNIFER [facility] 09- 12:04:07 ([facility])  Summary  Prescriptions  6  Prescribers  3  Pharmacies  2  Drug Classes  Benzodiazepines  0  Stimulants  6  Opioids  0  Muscle Relaxants  0  Opioid Dosage  Total MME for Active Prescriptions  0    Average MME  0.00         Prescriptions  Notifications    Prescribers  Pharmacies  MME Graph    Show All     [place]   Drug Categories:      Stimulants     Show  10  entries  Search:  Patient Id Prescription # Filled Written Drug Label Qty Days Strength MME** Prescriber Pharmacy Payment REFILL #/Auth State Detail  1 [id number] 08/03/2024 08/01/2024 Amphetamine Salt Combo (Tablet) 30.0 30 15 MG NA JENNIFER(MD) [facility] [place] 0 / 0 [place]   1 [id number] 06/29/2024 06/25/2024 Amphetamine Salt Combo (Tablet) 30.0 30 15 MG NA CHON) [facility] [place] 0 / 0 [place]   2 [id number] 05/29/2024 05/29/2024 Amphetamine Salt Combo (Tablet) 30.0 30 15 MG NA CHON) [facility] #[id number] [place] 0 / 0 [place]   3 [id number] 04/26/2024 04/15/2024 Amphetamine Salt Combo (Tablet) 30.0 30 15 MG NA CHON) [facility]6243 [place] 0 / 0 [place]   3 [id number] 02/23/2024 02/23/2024 Amphetamine Salt Combo (Tablet) 30.0 30 15 MG NA NATASHA SALEEM [facility]6243 Medicare 0 / 0 [place]   3 [id number] 12/22/2023 12/22/2023 Amphetamine Salt Combo (Tablet) 30.0 30 15 MG NA MOSHE SCHMIDT [facility] #[id number] Medicare 0 / 0 [place]

## 2024-09-19 NOTE — TELEPHONE ENCOUNTER
Pt's mother called to check on update of refill request. Advised request is still pending, waiting approval.

## 2024-10-06 DIAGNOSIS — E78.49 OTHER HYPERLIPIDEMIA: ICD-10-CM

## 2024-10-06 DIAGNOSIS — E03.8 OTHER SPECIFIED HYPOTHYROIDISM: ICD-10-CM

## 2024-10-06 DIAGNOSIS — R73.9 HYPERGLYCEMIA: Primary | ICD-10-CM

## 2024-10-08 ENCOUNTER — TELEPHONE (OUTPATIENT)
Dept: FAMILY MEDICINE CLINIC | Facility: CLINIC | Age: 29
End: 2024-10-08

## 2024-10-08 NOTE — TELEPHONE ENCOUNTER
----- Message from Yoni Ingram MD sent at 10/6/2024 11:04 PM EDT -----  His blood work showed elevated TSH.  Please make sure he is taking his medication.  Also his A1c was elevated at 6.4 this is a prediabetes.  I placed an order for blood work to be done before his next appointment on October 16.

## 2024-10-08 NOTE — TELEPHONE ENCOUNTER
Pt's mom was returning call for lab results.  Agreed to read below message.    Confusion on when to repeat labs.  Spoke to [place] and stated to just come to 10/16 appt as labs were just recently taken.

## 2024-10-16 ENCOUNTER — OFFICE VISIT (OUTPATIENT)
Dept: FAMILY MEDICINE CLINIC | Facility: CLINIC | Age: 29
End: 2024-10-16
Payer: MEDICARE

## 2024-10-16 VITALS
DIASTOLIC BLOOD PRESSURE: 80 MMHG | WEIGHT: 279.4 LBS | OXYGEN SATURATION: 98 % | HEIGHT: 70 IN | HEART RATE: 96 BPM | BODY MASS INDEX: 40 KG/M2 | RESPIRATION RATE: 16 BRPM | SYSTOLIC BLOOD PRESSURE: 114 MMHG | TEMPERATURE: 99.4 F

## 2024-10-16 DIAGNOSIS — F90.9 ADULT ADHD (ATTENTION DEFICIT HYPERACTIVITY DISORDER): ICD-10-CM

## 2024-10-16 DIAGNOSIS — E78.49 OTHER HYPERLIPIDEMIA: Primary | ICD-10-CM

## 2024-10-16 DIAGNOSIS — E03.8 OTHER SPECIFIED HYPOTHYROIDISM: ICD-10-CM

## 2024-10-16 DIAGNOSIS — R73.9 HYPERGLYCEMIA: ICD-10-CM

## 2024-10-16 PROCEDURE — G2211 COMPLEX E/M VISIT ADD ON: HCPCS | Performed by: FAMILY MEDICINE

## 2024-10-16 PROCEDURE — 99214 OFFICE O/P EST MOD 30 MIN: CPT | Performed by: FAMILY MEDICINE

## 2024-10-16 RX ORDER — LEVOTHYROXINE SODIUM 50 UG/1
50 TABLET ORAL DAILY
Qty: 90 TABLET | Refills: 1 | Status: SHIPPED | OUTPATIENT
Start: 2024-10-16 | End: 2024-10-24 | Stop reason: SDUPTHER

## 2024-10-16 NOTE — ASSESSMENT & PLAN NOTE
Not well controlled, TSH elevated on September labs. Increase Synthroid to 50 mcg and repeat labs in 2 months. Will continue to monitor.   Orders:    levothyroxine (Synthroid) 50 mcg tablet; Take 1 tablet (50 mcg total) by mouth daily    TSH, 3rd generation with Free T4 reflex; Future

## 2024-10-16 NOTE — ASSESSMENT & PLAN NOTE
Total cholesterol elevated in September. Emphasized importance of lower fat diet and exercise. Routine labs ordered in office today. Will continue to monitor.  Orders:    CBC and differential; Future    Comprehensive metabolic panel; Future    TSH, 3rd generation with Free T4 reflex; Future    Lipid Panel with Direct LDL reflex; Future

## 2024-10-16 NOTE — PROGRESS NOTES
Ambulatory Visit  Name: Jesus Maria      : 1995      MRN: [id number]  Encounter Provider: Yoni Ingram MD  Encounter Date: 10/16/2024   Encounter department: [facility]    Assessment & Plan  Other specified hypothyroidism  Not well controlled, TSH elevated on September labs. Increase Synthroid to 50 mcg and repeat labs in 2 months. Will continue to monitor.   Orders:    levothyroxine (Synthroid) 50 mcg tablet; Take 1 tablet (50 mcg total) by mouth daily    TSH, 3rd generation with Free T4 reflex; Future    Other hyperlipidemia  Total cholesterol elevated in September. Emphasized importance of lower fat diet and exercise. Routine labs ordered in office today. Will continue to monitor.  Orders:    CBC and differential; Future    Comprehensive metabolic panel; Future    TSH, 3rd generation with Free T4 reflex; Future    Lipid Panel with Direct LDL reflex; Future    Hyperglycemia  A1C elevated at 6.4. Patient does not wish to start GLP1 at this time, will focus on improving diet and exercise. Discussed risks associated with prediabetes and emphasized the importance of lifestyle changes. Labs ordered in office today, will continue to monitor.   Orders:    Hemoglobin A1C; Future    Adult ADHD (attention deficit hyperactivity disorder)  Well controlled with 15 mg Adderall daily. Will continue to monitor.          History of Present Illness     SH is a 29 y.o. male with a PMH of hypothyroidism and hyperglycemia who presents for a 3 month follow up. His TSH was elevated on labs performed in September while taking 25 mcg levothyroxine. He is asymptomatic. His A1C in September was 6.4 and his fasting glucose was elevated. He did not desire GLP1 prescription at his last visit. He reports that he walks for exercise and tries to eat a well balanced diet. He denies specific concerns and denies SOB, chest pain, N/V/D and swelling of the legs.      History obtained from : patient  Review of  "Systems   Constitutional:  Negative for chills and fever.   HENT:  Negative for ear pain and sore throat.    Eyes:  Negative for pain and visual disturbance.   Respiratory:  Negative for cough and shortness of breath.    Cardiovascular:  Negative for chest pain and palpitations.   Gastrointestinal:  Negative for abdominal pain and vomiting.   Genitourinary:  Negative for dysuria and hematuria.   Musculoskeletal:  Negative for arthralgias and back pain.   Skin:  Negative for color change and rash.   Neurological:  Negative for seizures and syncope.   All other systems reviewed and are negative.    Current Outpatient Medications on File Prior to Visit   Medication Sig Dispense Refill    amphetamine-dextroamphetamine (ADDERALL) 15 MG tablet Take 1 tablet (15 mg total) by mouth daily Max Daily Amount: 15 mg 30 tablet 0    [DISCONTINUED] levothyroxine (Synthroid) 25 mcg tablet Take 1 tablet (25 mcg total) by mouth daily 90 tablet 1     No current facility-administered medications on file prior to visit.          Objective     /80 (BP Location: Left arm, Patient Position: Sitting, Cuff Size: Large)   Pulse 96   Temp 99.4 °F (37.4 °C) (Tympanic)   Resp 16   Ht 5' 10\" (1.778 m)   Wt 127 kg (279 lb 6.4 oz)   SpO2 98%   BMI 40.09 kg/m²     Physical Exam  Vitals and nursing note reviewed.   Constitutional:       General: He is not in acute distress.     Appearance: He is well-developed.   HENT:      Head: Normocephalic and atraumatic.      Nose: Nose normal.      Mouth/Throat:      Mouth: Mucous membranes are moist.   Eyes:      Conjunctiva/sclera: Conjunctivae normal.   Cardiovascular:      Rate and Rhythm: Normal rate and regular rhythm.      Heart sounds: No murmur heard.  Pulmonary:      Effort: Pulmonary effort is normal. No respiratory distress.      Breath sounds: Normal breath sounds.   Musculoskeletal:         General: No swelling.      Cervical back: Neck supple.   Skin:     General: Skin is warm and " dry.      Capillary Refill: Capillary refill takes less than 2 seconds.   Neurological:      Mental Status: He is alert.   Psychiatric:         Mood and Affect: Mood normal.

## 2024-10-16 NOTE — ASSESSMENT & PLAN NOTE
A1C elevated at 6.4. Patient does not wish to start GLP1 at this time, will focus on improving diet and exercise. Discussed risks associated with prediabetes and emphasized the importance of lifestyle changes. Labs ordered in office today, will continue to monitor.   Orders:    Hemoglobin A1C; Future

## 2024-10-24 DIAGNOSIS — E03.8 OTHER SPECIFIED HYPOTHYROIDISM: ICD-10-CM

## 2024-10-24 RX ORDER — LEVOTHYROXINE SODIUM 50 UG/1
50 TABLET ORAL DAILY
Qty: 90 TABLET | Refills: 1 | Status: SHIPPED | OUTPATIENT
Start: 2024-10-24

## 2024-10-24 NOTE — TELEPHONE ENCOUNTER
CHANGE OF PHARMACY - NOT A DUPLICATE ORDER  Medication:  levothyroxine (Synthroid) 50 mcg tablet    Dose/Frequency:   Take 1 tablet (50 mcg total) by mouth daily    Quantity: 90 tablet     Pharmacy: [facility] - [place] - [facility]    Office:   [x] PCP/Provider - Yoni Ingram MD   [] Speciality/Provider -     Does the patient have enough for 3 days?   [] Yes   [x] No - Send as HP to POD

## 2024-11-26 DIAGNOSIS — F90.9 ADULT ADHD (ATTENTION DEFICIT HYPERACTIVITY DISORDER): ICD-10-CM

## 2024-11-26 DIAGNOSIS — E03.8 OTHER SPECIFIED HYPOTHYROIDISM: ICD-10-CM

## 2024-11-26 NOTE — TELEPHONE ENCOUNTER
Reason for call:   [x] Refill   [] Prior Auth  [] Other:     Office:   [x] PCP/Provider - Yoni Ingram MD   [] Specialty/Provider -     Medication: (ADDERALL) 15 MG     Dose/Frequency: 1 tab daily     Quantity: 30 tabs    Pharmacy: [facility] - [place] - [facility]    Does the patient have enough for 3 days?   [x] Yes   [] No - Send as HP to POD

## 2024-11-27 RX ORDER — DEXTROAMPHETAMINE SACCHARATE, AMPHETAMINE ASPARTATE, DEXTROAMPHETAMINE SULFATE AND AMPHETAMINE SULFATE 3.75; 3.75; 3.75; 3.75 MG/1; MG/1; MG/1; MG/1
15 TABLET ORAL DAILY
Qty: 30 TABLET | Refills: 0 | Status: SHIPPED | OUTPATIENT
Start: 2024-11-27

## 2024-11-27 NOTE — TELEPHONE ENCOUNTER
Pharmacy requesting refill on adderall  Last seen 10/16/24  Has appt 1/22/25     1 [facility] 1995 M [id number] [place]   2 [facility] 1995 M [id number] [place]   3 [facility] 1995 M [address] [facility] [id number] [facility]      Search Criteria  Name Date of Birth Date Range  [facility] 1995 11- To 11-  Requester Name Requested Date  JENNIFER [facility] 11- 14:04:02 ([facility])  Summary  Prescriptions  7  Prescribers  3  Pharmacies  2  Drug Classes  Benzodiazepines  0  Stimulants  7  Opioids  0  Muscle Relaxants  0  Opioid Dosage  Total MME for Active Prescriptions  0    Average MME  0.00         Prescriptions  Notifications    Prescribers  Pharmacies  MME Graph    Show All     [place]   Drug Categories:      Stimulants     Show  10  entries  Search:  Patient Id Prescription # Filled Written Drug Label Qty Days Strength MME** Prescriber Pharmacy Payment REFILL #/Auth State Detail  1 [id number] 09/24/2024 09/19/2024 Amphetamine Salt Combo (Tablet) 30.0 30 15 MG NA CHON) [facility] [place] 0 [place]   1 [id number] 08/03/2024 08/01/2024 Amphetamine Salt Combo (Tablet) 30.0 30 15 MG NA CHON) [facility] [place]   1 [id number] 06/29/2024 06/25/2024 Amphetamine Salt Combo (Tablet) 30.0 30 15 MG NA CHON) [facility] [place]   2 [id number] 05/29/2024 05/29/2024 Amphetamine Salt Combo (Tablet) 30.0 30 15 MG NA CHON) [id number] [place] / 0 [place]   3 [id number] 04/26/2024 04/15/2024 Amphetamine Salt Combo (Tablet) 30.0 30 15 MG NA CHON) [id number] [place] / 0 [place]   3 [id number] 02/23/2024 02/23/2024 Amphetamine Salt Combo (Tablet) 30.0 30 15 MG NA NATASHA SALE[id number] [place] 0 / 0 [place]   3 [id number] 12/22/2023 12/22/2023 Amphetamine Salt Combo (Tablet) 30.0 30 15 MG NA MOSHE SCHMIDT [facility]

## 2024-12-06 ENCOUNTER — TELEPHONE (OUTPATIENT)
Dept: FAMILY MEDICINE CLINIC | Facility: CLINIC | Age: 29
End: 2024-12-06

## 2024-12-06 DIAGNOSIS — E03.8 OTHER SPECIFIED HYPOTHYROIDISM: ICD-10-CM

## 2024-12-06 RX ORDER — LEVOTHYROXINE SODIUM 50 UG/1
50 TABLET ORAL DAILY
Qty: 90 TABLET | Refills: 1 | Status: CANCELLED | OUTPATIENT
Start: 2024-12-06

## 2024-12-06 NOTE — TELEPHONE ENCOUNTER
Yoanna - patients mother        Patients mom dropped his medication on the bathroom floor and the patient will not take it now.  Mom is asking for a new scrip to be called in to the pharmacy    levothyroxine (Synthroid) 50 mcg tablet      Pharmacy  [address] [address]  Phone: [phone]    CB: [phone]

## 2024-12-06 NOTE — TELEPHONE ENCOUNTER
I called pt mom she is aware that there should be a refill on the levothyroxine. She was given the number to call [facility]/ she is aware if it is to soon they might charge her. She will then check with [employer] to asist her with receiving a discount if it would need to be paid out of pocket at which time we can send a 30 day supply.

## 2024-12-16 DIAGNOSIS — E03.8 OTHER SPECIFIED HYPOTHYROIDISM: ICD-10-CM

## 2024-12-16 RX ORDER — LEVOTHYROXINE SODIUM 50 UG/1
50 TABLET ORAL DAILY
Qty: 90 TABLET | Refills: 1 | Status: SHIPPED | OUTPATIENT
Start: 2024-12-16

## 2024-12-16 NOTE — TELEPHONE ENCOUNTER
Patient mother called. She stated [facility] told her someone from the doctors office need to call the insurance company to get a override to fill levothyroxine (Synthroid) 50 mcg tablet before 1/1/25. Patient mother knocked over the bottle of pills. Patient has been without this medication for almost 2 weeks now.

## 2024-12-16 NOTE — TELEPHONE ENCOUNTER
I called [facility] to get an override on the synthroid 50 mcg and they approved it. Please send a new rx

## 2024-12-17 DIAGNOSIS — E03.8 OTHER SPECIFIED HYPOTHYROIDISM: Primary | ICD-10-CM

## 2024-12-17 RX ORDER — LEVOTHYROXINE SODIUM 50 UG/1
50 TABLET ORAL DAILY
Qty: 15 TABLET | Refills: 0 | Status: SHIPPED | OUTPATIENT
Start: 2024-12-17

## 2024-12-17 NOTE — TELEPHONE ENCOUNTER
Please send a mail order hold over. Patient has been out of medication for the past week or 2     Reason for call:   [x] Refill   [] Prior Auth  [] Other:     Office:   [x] PCP/Provider - Nataly  [] Specialty/Provider -     Medication: Levothyroxine 50mcg    Dose/Frequency: 1 tab daily     Quantity: 15    Pharmacy: [address] [place] - [place] [address] [place] [phone]     Does the patient have enough for 3 days?   [] Yes   [x] No - Send as HP to POD

## 2024-12-30 DIAGNOSIS — E03.8 OTHER SPECIFIED HYPOTHYROIDISM: ICD-10-CM

## 2024-12-30 DIAGNOSIS — F90.9 ADULT ADHD (ATTENTION DEFICIT HYPERACTIVITY DISORDER): ICD-10-CM

## 2024-12-30 NOTE — TELEPHONE ENCOUNTER
Reason for call:   [x] Refill   [] Prior Auth  [] Other:     Office:   [x] PCP/Provider - [facility]  Authorized By: Yoni Ingram MD    [] Specialty/Provider -     Medication: amphetamine-dextroamphetamine (ADDERALL) 15 MG tablet    Dose/Frequency: Take 1 tablet (15 mg total) by mouth daily    Quantity: 30 tablet    Pharmacy: [facility]     Does the patient have enough for 3 days?   [x] Yes   [] No - Send as HP to POD    Reason for call:   [x] Refill   [] Prior Auth  [] Other:     Office:   [x] PCP/Provider - [facility]  Authorized By: Yoni Ingram MD    [] Specialty/Provider -     Medication: levothyroxine (Synthroid) 50 mcg tablet    Dose/Frequency: Take 1 tablet (50 mcg total) by mouth daily    Quantity: 90 tablet    Pharmacy: [facility]     Does the patient have enough for 3 days?   [x] Yes   [] No - Send as HP to POD

## 2024-12-31 RX ORDER — DEXTROAMPHETAMINE SACCHARATE, AMPHETAMINE ASPARTATE, DEXTROAMPHETAMINE SULFATE AND AMPHETAMINE SULFATE 3.75; 3.75; 3.75; 3.75 MG/1; MG/1; MG/1; MG/1
15 TABLET ORAL DAILY
Qty: 30 TABLET | Refills: 0 | Status: SHIPPED | OUTPATIENT
Start: 2024-12-31

## 2024-12-31 RX ORDER — LEVOTHYROXINE SODIUM 50 UG/1
50 TABLET ORAL DAILY
Qty: 90 TABLET | Refills: 0 | Status: SHIPPED | OUTPATIENT
Start: 2024-12-31

## 2024-12-31 NOTE — TELEPHONE ENCOUNTER
Last seen 10/16/24  Has appt 1/22/25     1 [facility] 1995 M [id number] [place]   2 [facility] 1995 M [id number] [place]   3 [facility] 1995 M [address] [facility] [id number] [facility]      Search Criteria  Name Date of Birth Date Range  [facility] 1995 01- To 12-  Requester Name Requested Date  [facility]28[facility]26 [facility]  Summary  Prescriptions  7  Prescribers  2  Pharmacies  2  Drug Classes  Benzodiazepines  0  Stimulants  7  Opioids  0  Muscle Relaxants  0  Opioid Dosage  Total MME for Active Prescriptions  0    Average MME  0.00         Prescriptions  Notifications    Prescribers  Pharmacies  MME Graph    Show All     [place]   Drug Categories:      Stimulants     Show  10  entries  Search:  Patient Id Prescription # Filled Written Drug Label Qty Days Strength MME** Prescriber Pharmacy Payment REFILL #/Auth State Detail  1 [id number] 12/02/2024 11/27/2024 Amphetamine Salt Combo (Tablet) 30.0 30 15 MG NA JOSEM(MD) [facility] [place] 0 [place]   1 [id number] 09/24/2024 09/19/2024 Amphetamine Salt Combo (Tablet) 30.0 30 15 MG NA JOSEM(MD) [facility] [place] / 0 [place]   1 [id number] 08/03/2024 08/01/2024 Amphetamine Salt Combo (Tablet) 30.0 30 15 MG NA JOSEM(MD) [facility] [place] 0 / 0 [place]   1 [id number] 06/29/2024 06/25/2024 Amphetamine Salt Combo (Tablet) 30.0 30 15 MG NA WASSIM(MD) [facility] [place] 0 / 0 [place]   2 [id number] 05/29/2024 05/29/2024 Amphetamine Salt Combo (Tablet) 30.0 30 15 MG NA WASSIM(MD) [id number] [place] 0 / 0 [place]   3 [id number] 04/26/2024 04/15/2024 Amphetamine Salt Combo (Tablet) 30.0 30 15 MG NA [facility](MD[facility] 0 / 0 [place]   3 [id number] 02/23/2024 02/23/2024 Amphetamine Salt Combo (Tablet) 30.0 30 15 MG NA NATASHA [id number]   S/W PATIENT verified that she in fact received the link that was sent multiple times.

## 2025-01-16 ENCOUNTER — APPOINTMENT (OUTPATIENT)
Dept: LAB | Age: 30
End: 2025-01-16
Payer: MEDICARE

## 2025-01-16 DIAGNOSIS — E03.8 OTHER SPECIFIED HYPOTHYROIDISM: ICD-10-CM

## 2025-01-16 DIAGNOSIS — R73.9 HYPERGLYCEMIA: ICD-10-CM

## 2025-01-16 DIAGNOSIS — E78.49 OTHER HYPERLIPIDEMIA: ICD-10-CM

## 2025-01-16 LAB
BASOPHILS # BLD AUTO: 0.07 THOUSANDS/ΜL (ref 0–0.1)
BASOPHILS NFR BLD AUTO: 1 % (ref 0–1)
EOSINOPHIL # BLD AUTO: 0.32 THOUSAND/ΜL (ref 0–0.61)
EOSINOPHIL NFR BLD AUTO: 5 % (ref 0–6)
ERYTHROCYTE [DISTWIDTH] IN BLOOD BY AUTOMATED COUNT: 12.8 % (ref 11.6–15.1)
EST. AVERAGE GLUCOSE BLD GHB EST-MCNC: 143 MG/DL
HBA1C MFR BLD: 6.6 %
HCT VFR BLD AUTO: 48.1 % (ref 36.5–49.3)
HGB BLD-MCNC: 15.4 G/DL (ref 12–17)
IMM GRANULOCYTES # BLD AUTO: 0.02 THOUSAND/UL (ref 0–0.2)
IMM GRANULOCYTES NFR BLD AUTO: 0 % (ref 0–2)
LYMPHOCYTES # BLD AUTO: 2.12 THOUSANDS/ΜL (ref 0.6–4.47)
LYMPHOCYTES NFR BLD AUTO: 31 % (ref 14–44)
MCH RBC QN AUTO: 29.1 PG (ref 26.8–34.3)
MCHC RBC AUTO-ENTMCNC: 32 G/DL (ref 31.4–37.4)
MCV RBC AUTO: 91 FL (ref 82–98)
MONOCYTES # BLD AUTO: 0.48 THOUSAND/ΜL (ref 0.17–1.22)
MONOCYTES NFR BLD AUTO: 7 % (ref 4–12)
NEUTROPHILS # BLD AUTO: 3.89 THOUSANDS/ΜL (ref 1.85–7.62)
NEUTS SEG NFR BLD AUTO: 56 % (ref 43–75)
NRBC BLD AUTO-RTO: 0 /100 WBCS
PLATELET # BLD AUTO: 313 THOUSANDS/UL (ref 149–390)
PMV BLD AUTO: 11.6 FL (ref 8.9–12.7)
RBC # BLD AUTO: 5.3 MILLION/UL (ref 3.88–5.62)
T4 FREE SERPL-MCNC: 0.78 NG/DL (ref 0.61–1.12)
TSH SERPL DL<=0.05 MIU/L-ACNC: 5.9 UIU/ML (ref 0.45–4.5)
WBC # BLD AUTO: 6.9 THOUSAND/UL (ref 4.31–10.16)

## 2025-01-16 PROCEDURE — 84443 ASSAY THYROID STIM HORMONE: CPT

## 2025-01-16 PROCEDURE — 84439 ASSAY OF FREE THYROXINE: CPT

## 2025-01-16 PROCEDURE — 36415 COLL VENOUS BLD VENIPUNCTURE: CPT

## 2025-01-16 PROCEDURE — 80061 LIPID PANEL: CPT

## 2025-01-16 PROCEDURE — 83036 HEMOGLOBIN GLYCOSYLATED A1C: CPT

## 2025-01-16 PROCEDURE — 80053 COMPREHEN METABOLIC PANEL: CPT

## 2025-01-16 PROCEDURE — 85025 COMPLETE CBC W/AUTO DIFF WBC: CPT

## 2025-01-17 LAB
ALBUMIN SERPL BCG-MCNC: 4.4 G/DL (ref 3.5–5)
ALP SERPL-CCNC: 50 U/L (ref 34–104)
ALT SERPL W P-5'-P-CCNC: 33 U/L (ref 7–52)
ANION GAP SERPL CALCULATED.3IONS-SCNC: 9 MMOL/L (ref 4–13)
AST SERPL W P-5'-P-CCNC: 17 U/L (ref 13–39)
BILIRUB SERPL-MCNC: 0.39 MG/DL (ref 0.2–1)
BUN SERPL-MCNC: 18 MG/DL (ref 5–25)
CALCIUM SERPL-MCNC: 8.7 MG/DL (ref 8.4–10.2)
CHLORIDE SERPL-SCNC: 102 MMOL/L (ref 96–108)
CHOLEST SERPL-MCNC: 161 MG/DL (ref ?–200)
CO2 SERPL-SCNC: 28 MMOL/L (ref 21–32)
CREAT SERPL-MCNC: 0.96 MG/DL (ref 0.6–1.3)
GFR SERPL CREATININE-BSD FRML MDRD: 105 ML/MIN/1.73SQ M
GLUCOSE P FAST SERPL-MCNC: 92 MG/DL (ref 65–99)
HDLC SERPL-MCNC: 40 MG/DL
LDLC SERPL CALC-MCNC: 99 MG/DL (ref 0–100)
POTASSIUM SERPL-SCNC: 4.1 MMOL/L (ref 3.5–5.3)
PROT SERPL-MCNC: 7.8 G/DL (ref 6.4–8.4)
SODIUM SERPL-SCNC: 139 MMOL/L (ref 135–147)
TRIGL SERPL-MCNC: 108 MG/DL (ref ?–150)

## 2025-01-22 ENCOUNTER — OFFICE VISIT (OUTPATIENT)
Dept: FAMILY MEDICINE CLINIC | Facility: CLINIC | Age: 30
End: 2025-01-22
Payer: MEDICARE

## 2025-01-22 VITALS
HEART RATE: 101 BPM | WEIGHT: 292.6 LBS | RESPIRATION RATE: 16 BRPM | DIASTOLIC BLOOD PRESSURE: 86 MMHG | OXYGEN SATURATION: 98 % | HEIGHT: 70 IN | BODY MASS INDEX: 41.89 KG/M2 | TEMPERATURE: 97.2 F | SYSTOLIC BLOOD PRESSURE: 128 MMHG

## 2025-01-22 DIAGNOSIS — R03.0 ELEVATED BLOOD-PRESSURE READING WITHOUT DIAGNOSIS OF HYPERTENSION: ICD-10-CM

## 2025-01-22 DIAGNOSIS — E78.49 OTHER HYPERLIPIDEMIA: ICD-10-CM

## 2025-01-22 DIAGNOSIS — E66.01 MORBID OBESITY WITH BMI OF 40.0-44.9, ADULT (HCC): ICD-10-CM

## 2025-01-22 DIAGNOSIS — F90.9 ADULT ADHD (ATTENTION DEFICIT HYPERACTIVITY DISORDER): ICD-10-CM

## 2025-01-22 DIAGNOSIS — E03.8 OTHER SPECIFIED HYPOTHYROIDISM: ICD-10-CM

## 2025-01-22 DIAGNOSIS — R73.9 HYPERGLYCEMIA: Primary | ICD-10-CM

## 2025-01-22 PROCEDURE — G2211 COMPLEX E/M VISIT ADD ON: HCPCS | Performed by: FAMILY MEDICINE

## 2025-01-22 PROCEDURE — 99214 OFFICE O/P EST MOD 30 MIN: CPT | Performed by: FAMILY MEDICINE

## 2025-01-22 RX ORDER — DEXTROAMPHETAMINE SACCHARATE, AMPHETAMINE ASPARTATE, DEXTROAMPHETAMINE SULFATE AND AMPHETAMINE SULFATE 3.75; 3.75; 3.75; 3.75 MG/1; MG/1; MG/1; MG/1
15 TABLET ORAL DAILY
Qty: 30 TABLET | Refills: 0 | Status: SHIPPED | OUTPATIENT
Start: 2025-01-22

## 2025-01-22 RX ORDER — LEVOTHYROXINE SODIUM 75 UG/1
75 TABLET ORAL DAILY
Qty: 100 TABLET | Refills: 1 | Status: SHIPPED | OUTPATIENT
Start: 2025-01-22

## 2025-01-22 NOTE — PROGRESS NOTES
Name: Jesus Maria      : 1995      MRN: [id number]  Encounter Provider: Yoni Ingram MD  Encounter Date: 2025   [facility]    Assessment & Plan  Hyperglycemia  HgbA1c increased to 6.6 (6.4)  Repeat A1c in 3 months  Patient advised to limit carbs and sugar in diet     Orders:  •  Hemoglobin A1C; Future    Elevated blood-pressure reading without diagnosis of hypertension    Monitor again in 3 months       Morbid obesity with BMI of 40.0-44.9, adult (HCC)  Patient advised to limit carbs and sugar in diet  Continue exercise  Patient did not wish to start GLP-1 medicaiton at this time. Will discuss at next visit       Other specified hypothyroidism  Dose of levothyroxine increased to 75 mcg tablets once daily  Orders:  •  levothyroxine (Levoxyl) 75 mcg tablet; Take 1 tablet (75 mcg total) by mouth daily  •  TSH, 3rd generation with Free T4 reflex; Future    Adult ADHD (attention deficit hyperactivity disorder)    Orders:  •  amphetamine-dextroamphetamine (ADDERALL) 15 MG tablet; Take 1 tablet (15 mg total) by mouth daily Max Daily Amount: 15 mg    Other hyperlipidemia  Well controlled  Continue healthy lifestyle modifications  Orders:  •  CBC and differential; Future  •  Comprehensive metabolic panel; Future  •  Lipid Panel with Direct LDL reflex; Future  •  TSH, 3rd generation with Free T4 reflex; Future         History of Present Illness     Jesus Maria is a 29 year old male with pmh of hyperglycemia, hyperlipidemia, and obesity presenting to the pcp office today for a 3 month follow up. He has no new concerns that he would like to focus on today. He reports making positive changes. He states he has been riding a stationary bike and eating better. He reports he has been attempting to avoid foods that are higher in sugar.          Review of Systems   Constitutional:  Negative for fatigue and fever.   Eyes:  Negative for visual disturbance.   Respiratory:   "Positive for cough. Negative for shortness of breath.    Cardiovascular:  Negative for chest pain.   Gastrointestinal:  Negative for abdominal pain, nausea and vomiting.   Endocrine: Negative for polydipsia, polyphagia and polyuria.   Neurological:  Negative for dizziness and light-headedness.     Past Medical History:   Diagnosis Date   • ADHD (attention deficit hyperactivity disorder)      History reviewed. No pertinent surgical history.  Family History   Problem Relation Age of Onset   • Colon cancer Maternal Grandfather    • Colon cancer Cousin      Social History     Tobacco Use   • Smoking status: Never     Passive exposure: Never   • Smokeless tobacco: Never   Vaping Use   • Vaping status: Never Used   Substance and Sexual Activity   • Alcohol use: Never   • Drug use: Never   • Sexual activity: Not on file     Current Outpatient Medications on File Prior to Visit   Medication Sig   • [DISCONTINUED] amphetamine-dextroamphetamine (ADDERALL) 15 MG tablet Take 1 tablet (15 mg total) by mouth daily Max Daily Amount: 15 mg   • [DISCONTINUED] levothyroxine (Synthroid) 50 mcg tablet Take 1 tablet (50 mcg total) by mouth daily   • [DISCONTINUED] levothyroxine (Levoxyl) 50 mcg tablet Take 1 tablet (50 mcg total) by mouth daily (Patient not taking: Reported on 1/22/2025)     No Known Allergies    There is no immunization history on file for this patient.  Objective   /86 (BP Location: Left arm, Patient Position: Sitting, Cuff Size: Standard)   Pulse 101   Temp (!) 97.2 °F (36.2 °C) (Tympanic)   Resp 16   Ht 5' 10\" (1.778 m)   Wt 133 kg (292 lb 9.6 oz)   SpO2 98%   BMI 41.98 kg/m²     Physical Exam  Vitals and nursing note reviewed.   Constitutional:       General: He is not in acute distress.     Appearance: He is well-developed.   HENT:      Head: Normocephalic and atraumatic.   Eyes:      Pupils: Pupils are equal, round, and reactive to light.   Cardiovascular:      Rate and Rhythm: Normal rate and " regular rhythm.      Heart sounds: Normal heart sounds. No murmur heard.     No friction rub. No gallop.   Pulmonary:      Effort: Pulmonary effort is normal. No respiratory distress.      Breath sounds: Normal breath sounds. No wheezing, rhonchi or rales.   Abdominal:      General: Bowel sounds are normal.      Palpations: Abdomen is soft.   Musculoskeletal:      Cervical back: Normal range of motion and neck supple.   Lymphadenopathy:      Cervical: No cervical adenopathy.   Skin:     General: Skin is warm and dry.      Findings: No rash.   Neurological:      General: No focal deficit present.      Mental Status: He is alert and oriented to person, place, and time.   Psychiatric:         Mood and Affect: Mood normal.         Behavior: Behavior normal.

## 2025-01-22 NOTE — ASSESSMENT & PLAN NOTE
Orders:  •  amphetamine-dextroamphetamine (ADDERALL) 15 MG tablet; Take 1 tablet (15 mg total) by mouth daily Max Daily Amount: 15 mg

## 2025-01-22 NOTE — ASSESSMENT & PLAN NOTE
Dose of levothyroxine increased to 75 mcg tablets once daily  Orders:  •  levothyroxine (Levoxyl) 75 mcg tablet; Take 1 tablet (75 mcg total) by mouth daily  •  TSH, 3rd generation with Free T4 reflex; Future

## 2025-01-22 NOTE — ASSESSMENT & PLAN NOTE
Well controlled  Continue healthy lifestyle modifications  Orders:  •  CBC and differential; Future  •  Comprehensive metabolic panel; Future  •  Lipid Panel with Direct LDL reflex; Future  •  TSH, 3rd generation with Free T4 reflex; Future

## 2025-01-22 NOTE — ASSESSMENT & PLAN NOTE
HgbA1c increased to 6.6 (6.4)  Repeat A1c in 3 months  Patient advised to limit carbs and sugar in diet     Orders:  •  Hemoglobin A1C; Future

## 2025-03-07 DIAGNOSIS — F90.9 ADULT ADHD (ATTENTION DEFICIT HYPERACTIVITY DISORDER): ICD-10-CM

## 2025-03-07 RX ORDER — DEXTROAMPHETAMINE SACCHARATE, AMPHETAMINE ASPARTATE, DEXTROAMPHETAMINE SULFATE AND AMPHETAMINE SULFATE 3.75; 3.75; 3.75; 3.75 MG/1; MG/1; MG/1; MG/1
15 TABLET ORAL DAILY
Qty: 30 TABLET | Refills: 0 | Status: SHIPPED | OUTPATIENT
Start: 2025-03-07

## 2025-03-07 NOTE — TELEPHONE ENCOUNTER
Pt last seen 25, has appt 25.    Pt requesting refill on:  amphetamine-dextroamphetamine (ADDERALL) 15 MG tablet.        Patients  Select Patient Id Name  Gender [facility] State   1 [facility] 1995 M [id number] [place]   2 [facility] 1995 M [id number] [facility]   3 [facility] 1995 M [address] [facility] [id number] [facility]      Search Criteria  Name Date of Birth Date Range  [facility] 1995 To 2025  Requester Name Requested Date  JOSEMAMIE [facility] 2025 14:07:35 ([facility])  Summary  Prescriptions  8  Prescribers  1  Pharmacies  2  Drug Classes  Benzodiazepines  0  Stimulants  8  Opioids  0  Muscle Relaxants  0  Opioid Dosage  Total MME for Active Prescriptions  0    Average MME  0.00         Prescriptions  Notifications    Prescribers  Pharmacies  MME Graph    Show All     [place]   Drug Categories:      Stimulants     Show  10  entries  Search:  Patient Id Prescription # Filled Written Drug Label Qty Days Strength MME** Prescriber Pharmacy Payment REFILL #/Auth State Detail  1 [id number] 2025 Amphetamine Salt Combo (Tablet) 30.0 30 15 MG NA CHON) [facility] [place] / 0 [place]   1 [id number] 2025 Amphetamine Salt Combo (Tablet) 30.0 30 15 MG NA CHON) [facility] [place] / 0 [place]   1 [id number] 2024 Amphetamine Salt Combo (Tablet) 30.0 30 15 MG NA CHON) [facility] [place] 0 / 0 [place]   1 [id number] 2024 Amphetamine Salt Combo (Tablet) 30.0 30 15 MG NA CHON) [facility] [place] 0 / 0 [place]   1 [id number] 2024 Amphetamine Salt Combo (Tablet) 30.0 30 15 MG NA CHON) [facility] Medicare 0 / 0 [place]   1 [id number] 2024 Amphetamine Salt Combo (Tablet) 30.0 30 15 MG NA CHON) [facility] Medicare 0  0 [place]   2 [id number] 2024 Amphetamine Salt Combo  (Tablet) 30.0 30 15 MG NA CHON) [id number] Medicare 0 / 0 [place]   3 [id number] 04/26/2024 04/15/2024 Amphetamine Salt Combo (Tablet) 30.0 30 15 MG NA WASSHY

## 2025-03-07 NOTE — TELEPHONE ENCOUNTER
Reason for call:   [x] Refill   [] Prior Auth  [] Other:     Office:   [x] PCP/Provider - [facility]   [] Specialty/Provider -     Medication: amphetamine-dextroamphetamine (ADDERALL)     Dose/Frequency: 15mg  One Daily       Quantity: 30      Pharmacy: [facility]   Does the patient have enough for 3 days?   [] Yes   [] No - Send as HP to POD    Mail Away Pharmacy   Does the patient have enough for 10 days?   [] Yes   [x] No - Send as HP to POD

## 2025-03-17 ENCOUNTER — TELEPHONE (OUTPATIENT)
Age: 30
End: 2025-03-17

## 2025-03-17 DIAGNOSIS — R05.8 OTHER COUGH: Primary | ICD-10-CM

## 2025-03-17 DIAGNOSIS — R05.1 ACUTE COUGH: Primary | ICD-10-CM

## 2025-03-17 RX ORDER — BENZONATATE 100 MG/1
100 CAPSULE ORAL 3 TIMES DAILY PRN
Qty: 20 CAPSULE | Refills: 0 | Status: CANCELLED | OUTPATIENT
Start: 2025-03-17

## 2025-03-17 RX ORDER — BENZONATATE 200 MG/1
200 CAPSULE ORAL 3 TIMES DAILY PRN
Qty: 20 CAPSULE | Refills: 0 | Status: SHIPPED | OUTPATIENT
Start: 2025-03-17

## 2025-03-17 NOTE — TELEPHONE ENCOUNTER
Patients grandmother called in and wants to know what patient can take OTC to help with runny nose,cough and congestion with the medications that he's taking. Please advise

## 2025-03-17 NOTE — TELEPHONE ENCOUNTER
Pt is having some cold symptoms and would like to know what he can take OTC  with the current meds he is on   Please advise

## 2025-04-03 ENCOUNTER — RA CDI HCC (OUTPATIENT)
Dept: OTHER | Facility: HOSPITAL | Age: 30
End: 2025-04-03

## 2025-04-03 NOTE — PROGRESS NOTES
HCC coding opportunities          Chart Reviewed number of suggestions sent to Provider: 1     Patients Insurance     Medicare Insurance: [employer]          Per CMS/ICD 10 coding guidelines, to be used when BMI >=40, with BMI code    E66.01: Morbid (severe) obesity due to excess calories (HCC) [1213185]

## 2025-04-23 DIAGNOSIS — F90.9 ADULT ADHD (ATTENTION DEFICIT HYPERACTIVITY DISORDER): ICD-10-CM

## 2025-04-23 RX ORDER — DEXTROAMPHETAMINE SACCHARATE, AMPHETAMINE ASPARTATE, DEXTROAMPHETAMINE SULFATE AND AMPHETAMINE SULFATE 3.75; 3.75; 3.75; 3.75 MG/1; MG/1; MG/1; MG/1
15 TABLET ORAL DAILY
Qty: 30 TABLET | Refills: 0 | Status: SHIPPED | OUTPATIENT
Start: 2025-04-23 | End: 2025-04-28 | Stop reason: SDUPTHER

## 2025-04-28 DIAGNOSIS — F90.9 ADULT ADHD (ATTENTION DEFICIT HYPERACTIVITY DISORDER): ICD-10-CM

## 2025-04-28 RX ORDER — DEXTROAMPHETAMINE SACCHARATE, AMPHETAMINE ASPARTATE, DEXTROAMPHETAMINE SULFATE AND AMPHETAMINE SULFATE 3.75; 3.75; 3.75; 3.75 MG/1; MG/1; MG/1; MG/1
15 TABLET ORAL DAILY
Qty: 30 TABLET | Refills: 0 | Status: SHIPPED | OUTPATIENT
Start: 2025-04-28 | End: 2025-05-02 | Stop reason: SDUPTHER

## 2025-04-28 NOTE — TELEPHONE ENCOUNTER
Pt last seen 25 and I [facility] and sent to provider for approval    Patient Prescription Report    Patients      Select Patient Id Name  [facility] State   [] 1 [facility] 1995 [place] [id number] [place]   [] 2 [facility] 1995 M [id number] [place]           Search Criteria    Name Date of Birth Date Range   [facility] 1995 To 2025     Requester Name Requested Date   [facility] ([facility]     Summary  Prescriptions  8  Prescribers  2  Pharmacies  2  View Map  Drug Classes  Benzodiazepines  0  Stimulants  8  Opioids  0  Muscle Relaxants  0  Opioid Dosage  Total MME for Active Prescriptions  0    Average MME  0.00  MME Graph   MME Calculator     Prescriptions  Notifications    Prescribers  Pharmacies  MME Graph    [] [place] Drug Categories:     [] Stimulants      Showentries  Search:  Patient Id Prescription # Filled Written Drug Label Qty Days Strength MME** Prescriber Pharmacy Payment REFILL #/Auth State Detail   1 [id number] 2025 Amphetamine Salt Combo (Tablet) 30.0 30 15 MG NA [facility]O BEL[facility]NIN [facility] [place]    1 [id number] 2025 Amphetamine Salt Combo (Tablet) 30.0 30 15 MG NA CHON) [facility] [place]    1 [id number] 2025 Amphetamine Salt Combo (Tablet) 30.0 30 15 MG NA CHON) [facility] [place]    1 [id number] 2024 Amphetamine Salt Combo (Tablet) 30.0 30 15 MG NA CHON) [facility] [place]    1 [id number] 2024 Amphetamine Salt Combo (Tablet) 30.0 30 15 MG NA CHON) [facility] [place]    1 [id number] 2024 Amphetamine Salt Combo (Tablet) 30.0 30 15 MG NA CHON) [facility] [place]    1 [id number] 2024 Amphetamine Salt Combo  (Tablet) 30.0 30 15 MG NA CHON) [facility] [place]    2 [id number] 05/29/2024 05/29/2024 Amphetamine Salt Combo (Tablet) 30.0 30 15 MG NA JENNIFER(MD) DIPAK [id number] [place] 0 / 0 [place]    Showing 1 to 8 of 8 entries  [id number]     * Per CDC guidance, the conversion factors and associated daily morphine milligram equivalents for drugs prescribed as part of medication-assisted treatment for opioid use disorder should not be used to benchmark against dosage thresholds meant for opioids prescribed for pain.  Report Disclaimer:  Information from the [place] Prescription Drug Monitoring Program (PDMP) database is protected health information and any information accessed must be treated as confidential. Any person who knowingly or intentionally makes an unauthorized disclosure of information from the PDMP database will be subject to civil and criminal penalties as set forth in the ABC-MAP Act 191 of 2014; Act of Oct. 27, 2014, P.L. 2911, No. 191.    The information in the PDMP database is submitted by pharmacies and may contain errors and omissions. Additionally, pharmacies may submit extraneous non-controlled substance dispensations to the PDMP database; if a non-controlled substance is present on one patient’s Prescription Report, it should not be assumed that this same medication will be reported on another patient’s Prescription Report. Independent verification of prescription information with pharmacies and prescribers may sometimes be prudent or necessary.  Educational content  CDC MME calculation guidelines  [place] Prescribing Guidelines  Letter Regarding the Misapplication of Prescribing Guidelines   Guide for Appropriate Tapering or Discontinuation of Long-Term Opioid Use   #[id number]

## 2025-04-28 NOTE — TELEPHONE ENCOUNTER
Not a duplicate - Pharmacy Change    Reason for call:   [x] Refill   [] Prior Auth  [] Other:     Office:   [x] PCP/Provider - Yoni Ingram MD   [] Specialty/Provider -     Medication: amphetamine-dextroamphetamine (ADDERALL) 15 MG tablet / Take 1 tablet (15 mg total) by mouth daily Max Daily Amount: 15 mg     Pharmacy: [facility] - [address]     Mail Away Pharmacy   Does the patient have enough for 10 days?   [] Yes   [x] No - Send as HP to POD

## 2025-04-29 ENCOUNTER — TELEPHONE (OUTPATIENT)
Age: 30
End: 2025-04-29

## 2025-04-29 DIAGNOSIS — T78.40XS ALLERGY, SEQUELA: Primary | ICD-10-CM

## 2025-04-29 NOTE — TELEPHONE ENCOUNTER
Patient's grandmother Yoanna called and requested a lab order to check his blood for outdoor allergens. Please call her to advise.

## 2025-04-29 NOTE — TELEPHONE ENCOUNTER
Pt grandma would like to know if there is labs that can be ordered to check for environmental allergies or should  we place ref for allergist.    Please advise

## 2025-04-30 ENCOUNTER — TELEPHONE (OUTPATIENT)
Dept: FAMILY MEDICINE CLINIC | Facility: CLINIC | Age: 30
End: 2025-04-30

## 2025-05-02 DIAGNOSIS — F90.9 ADULT ADHD (ATTENTION DEFICIT HYPERACTIVITY DISORDER): ICD-10-CM

## 2025-05-02 RX ORDER — DEXTROAMPHETAMINE SACCHARATE, AMPHETAMINE ASPARTATE, DEXTROAMPHETAMINE SULFATE AND AMPHETAMINE SULFATE 3.75; 3.75; 3.75; 3.75 MG/1; MG/1; MG/1; MG/1
15 TABLET ORAL DAILY
Qty: 30 TABLET | Refills: 0 | Status: SHIPPED | OUTPATIENT
Start: 2025-05-02

## 2025-05-05 DIAGNOSIS — F90.9 ADULT ADHD (ATTENTION DEFICIT HYPERACTIVITY DISORDER): ICD-10-CM

## 2025-05-05 RX ORDER — DEXTROAMPHETAMINE SACCHARATE, AMPHETAMINE ASPARTATE, DEXTROAMPHETAMINE SULFATE AND AMPHETAMINE SULFATE 3.75; 3.75; 3.75; 3.75 MG/1; MG/1; MG/1; MG/1
15 TABLET ORAL DAILY
Qty: 30 TABLET | Refills: 0 | OUTPATIENT
Start: 2025-05-05

## 2025-05-05 NOTE — TELEPHONE ENCOUNTER
Reason for call:   [x] Refill-refill sent to wrong pharmacy  [] Prior Auth  [] Other:     Office:   [x] PCP/Provider - [facility]  Authorized By: Yoni Ingram MD  [] Specialty/Provider -     Medication: amphetamine-dextroamphetamine (ADDERALL) 15 MG tablet     Dose/Frequency: Take 1 tablet (15 mg total) by mouth daily     Quantity: 30    Pharmacy: [place] [id number] [facility] [place] [place] [facility]   Does the patient have enough for 3 days?   [] Yes   [] No - Send as HP to POD    Mail Away Pharmacy   Does the patient have enough for 10 days?   [] Yes   [x] No - Send as HP to POD

## 2025-05-12 RX ORDER — DEXTROAMPHETAMINE SACCHARATE, AMPHETAMINE ASPARTATE, DEXTROAMPHETAMINE SULFATE AND AMPHETAMINE SULFATE 3.75; 3.75; 3.75; 3.75 MG/1; MG/1; MG/1; MG/1
15 TABLET ORAL DAILY
Qty: 30 TABLET | Refills: 0 | Status: SHIPPED | OUTPATIENT
Start: 2025-05-12

## 2025-05-12 NOTE — TELEPHONE ENCOUNTER
Can you please resend adderall to [facility] instead.      1 HASMUKH MARIA 1995 M [id number] [facility]   2 HASMUKH MARIA 1995 M [id number] [facility] [place]      Search Criteria  Name Date of Birth Date Range  Hasmukh Maria 1995 05- To 05-  Requester Name Requested Date  JENNIFER [facility] 05- 17:53:50 ([facility])  Summary  Prescriptions  8  Prescribers  2  Pharmacies  2  Drug Classes  Benzodiazepines  0  Stimulants  8  Opioids  0  Muscle Relaxants  0  Opioid Dosage  Total MME for Active Prescriptions  0    Average MME  0.00         Prescriptions  Notifications    Prescribers  Pharmacies  MME Graph    Show All     [place]   Drug Categories:      Stimulants     Show  10  entries  Search:  Patient Id Prescription # Sold Filled Written Drug Label Qty Days Strength MME* Prescriber Pharmacy Payment REFILL #/Auth State Detail  1 [id number] 03/09/2025 03/09/2025 03/07/2025 Amphetamine Salt Combo (Tablet) 30.0 30 15 MG NA [facility]O CHAN [facility] [place]   1 [id number] 01/24/2025 01/24/2025 01/22/2025 Amphetamine Salt Combo (Tablet) 30.0 30 15 MG NA CHON) [facility] [place]   1 [id number] 01/04/2025 01/04/2025 12/31/2024 Amphetamine Salt Combo (Tablet) 30.0 30 15 MG NA CHON) [facility] [place]   1 [id number] 12/02/2024 12/02/2024 11/27/2024 Amphetamine Salt Combo (Tablet) 30.0 30 15 MG NA CHON) [facility] [place]   1 [id number] 09/24/2024 09/24/2024 09/19/2024 Amphetamine Salt Combo (Tablet) 30.0 30 15 MG NA CHON) [facility] [place]   1 [id number] 08/03/2024 08/03/2024 08/01/2024 Amphetamine Salt Combo (Tablet) 30.0 30 15 MG NA CHON) [facility] [place]   1 [id number] 06/29/2024 06/29/2024 06/25/2024 Amphetamine Salt Combo (Tablet) 30.0 30 15 MG NA CHON) [facility] [place]  [place]   2 [id number] 05/30/2024 05/29/2024 05/29/2024 Amphetamine Salt Combo (Tablet) 30.0 30 15 MG NA CHON) [id number] Medicare 0

## 2025-05-12 NOTE — TELEPHONE ENCOUNTER
PHARMACY CHANGE    Mother calling, stating the script for the adderall was suppose to be sent to [facility] not to [facility]. Please have  sign and resend to [facility].

## 2025-05-15 ENCOUNTER — APPOINTMENT (OUTPATIENT)
Dept: LAB | Age: 30
End: 2025-05-15
Payer: MEDICARE

## 2025-05-15 DIAGNOSIS — E03.8 OTHER SPECIFIED HYPOTHYROIDISM: ICD-10-CM

## 2025-05-15 DIAGNOSIS — E78.49 OTHER HYPERLIPIDEMIA: ICD-10-CM

## 2025-05-15 DIAGNOSIS — R73.9 HYPERGLYCEMIA: ICD-10-CM

## 2025-05-15 LAB
ALBUMIN SERPL BCG-MCNC: 4.2 G/DL (ref 3.5–5)
ALP SERPL-CCNC: 61 U/L (ref 34–104)
ALT SERPL W P-5'-P-CCNC: 29 U/L (ref 7–52)
ANION GAP SERPL CALCULATED.3IONS-SCNC: 8 MMOL/L (ref 4–13)
AST SERPL W P-5'-P-CCNC: 15 U/L (ref 13–39)
BASOPHILS # BLD AUTO: 0.05 THOUSANDS/ÂΜL (ref 0–0.1)
BASOPHILS NFR BLD AUTO: 1 % (ref 0–1)
BILIRUB SERPL-MCNC: 0.38 MG/DL (ref 0.2–1)
BUN SERPL-MCNC: 16 MG/DL (ref 5–25)
CALCIUM SERPL-MCNC: 9.5 MG/DL (ref 8.4–10.2)
CHLORIDE SERPL-SCNC: 102 MMOL/L (ref 96–108)
CHOLEST SERPL-MCNC: 221 MG/DL (ref ?–200)
CO2 SERPL-SCNC: 27 MMOL/L (ref 21–32)
CREAT SERPL-MCNC: 0.94 MG/DL (ref 0.6–1.3)
EOSINOPHIL # BLD AUTO: 0.42 THOUSAND/ÂΜL (ref 0–0.61)
EOSINOPHIL NFR BLD AUTO: 6 % (ref 0–6)
ERYTHROCYTE [DISTWIDTH] IN BLOOD BY AUTOMATED COUNT: 12.5 % (ref 11.6–15.1)
EST. AVERAGE GLUCOSE BLD GHB EST-MCNC: 137 MG/DL
GFR SERPL CREATININE-BSD FRML MDRD: 108 ML/MIN/1.73SQ M
GLUCOSE P FAST SERPL-MCNC: 103 MG/DL (ref 65–99)
HBA1C MFR BLD: 6.4 %
HCT VFR BLD AUTO: 46.2 % (ref 36.5–49.3)
HDLC SERPL-MCNC: 43 MG/DL
HGB BLD-MCNC: 15.1 G/DL (ref 12–17)
IMM GRANULOCYTES # BLD AUTO: 0.02 THOUSAND/UL (ref 0–0.2)
IMM GRANULOCYTES NFR BLD AUTO: 0 % (ref 0–2)
LDLC SERPL CALC-MCNC: 155 MG/DL (ref 0–100)
LYMPHOCYTES # BLD AUTO: 2.24 THOUSANDS/ÂΜL (ref 0.6–4.47)
LYMPHOCYTES NFR BLD AUTO: 30 % (ref 14–44)
MCH RBC QN AUTO: 29.4 PG (ref 26.8–34.3)
MCHC RBC AUTO-ENTMCNC: 32.7 G/DL (ref 31.4–37.4)
MCV RBC AUTO: 90 FL (ref 82–98)
MONOCYTES # BLD AUTO: 0.5 THOUSAND/ÂΜL (ref 0.17–1.22)
MONOCYTES NFR BLD AUTO: 7 % (ref 4–12)
NEUTROPHILS # BLD AUTO: 4.31 THOUSANDS/ÂΜL (ref 1.85–7.62)
NEUTS SEG NFR BLD AUTO: 56 % (ref 43–75)
NRBC BLD AUTO-RTO: 0 /100 WBCS
PLATELET # BLD AUTO: 295 THOUSANDS/UL (ref 149–390)
PMV BLD AUTO: 11.7 FL (ref 8.9–12.7)
POTASSIUM SERPL-SCNC: 3.9 MMOL/L (ref 3.5–5.3)
PROT SERPL-MCNC: 7.2 G/DL (ref 6.4–8.4)
RBC # BLD AUTO: 5.13 MILLION/UL (ref 3.88–5.62)
SODIUM SERPL-SCNC: 137 MMOL/L (ref 135–147)
TRIGL SERPL-MCNC: 115 MG/DL (ref ?–150)
TSH SERPL DL<=0.05 MIU/L-ACNC: 1.72 UIU/ML (ref 0.45–4.5)
WBC # BLD AUTO: 7.54 THOUSAND/UL (ref 4.31–10.16)

## 2025-05-15 PROCEDURE — 85025 COMPLETE CBC W/AUTO DIFF WBC: CPT

## 2025-05-15 PROCEDURE — 80053 COMPREHEN METABOLIC PANEL: CPT

## 2025-05-15 PROCEDURE — 80061 LIPID PANEL: CPT

## 2025-05-15 PROCEDURE — 36415 COLL VENOUS BLD VENIPUNCTURE: CPT

## 2025-05-15 PROCEDURE — 83036 HEMOGLOBIN GLYCOSYLATED A1C: CPT

## 2025-05-15 PROCEDURE — 84443 ASSAY THYROID STIM HORMONE: CPT

## 2025-05-21 DIAGNOSIS — E03.8 OTHER SPECIFIED HYPOTHYROIDISM: Primary | ICD-10-CM

## 2025-05-21 RX ORDER — LEVOTHYROXINE SODIUM 75 UG/1
75 TABLET ORAL DAILY
Qty: 10 TABLET | Refills: 0 | Status: SHIPPED | OUTPATIENT
Start: 2025-05-21

## 2025-05-21 RX ORDER — LEVOTHYROXINE SODIUM 75 UG/1
75 TABLET ORAL DAILY
Qty: 100 TABLET | Refills: 1 | Status: CANCELLED | OUTPATIENT
Start: 2025-05-21

## 2025-05-21 NOTE — TELEPHONE ENCOUNTER
Reason for call:   [x] Refill   [] Prior Auth  [x] Other: Requesting short term supply    Office:   [x] PCP/Provider - [facility] - Yoni Ingram MD   [] Specialty/Provider -     Medication:  levothyroxine (Levoxyl) 75 mcg tablet    Dose/Frequency: Take 1 tablet (75 mcg total) by mouth daily     Quantity: 100 tablet    [facility] [place] [facility] [phone]    _________________________________      Medication:  levothyroxine (Levoxyl) 75 mcg tablet    Dose/Frequency: Take 1 tablet (75 mcg total) by mouth daily     Quantity: 10 tablet    Pharmacy: [address] [place] - [place] [address] [place] [phone]    Local Pharmacy   Does the patient have enough for 3 days?   [] Yes   [] No - Send as HP to POD    Mail Away Pharmacy   Does the patient have enough for 10 days?   [] Yes   [] No - Send as HP to POD

## 2025-05-28 DIAGNOSIS — E03.8 OTHER SPECIFIED HYPOTHYROIDISM: ICD-10-CM

## 2025-05-28 RX ORDER — LEVOTHYROXINE SODIUM 75 UG/1
75 TABLET ORAL DAILY
Qty: 100 TABLET | Refills: 1 | Status: SHIPPED | OUTPATIENT
Start: 2025-05-28

## 2025-05-28 NOTE — TELEPHONE ENCOUNTER
Reason for call:   [x] Refill   [] Prior Auth  [x] Other: Not a Duplicate - Short term supply provided previously.    Office:   [x] PCP/Provider - [facility] - Yoni Ingram MD   [] Specialty/Provider -     Medication:  levothyroxine (Levoxyl) 75 mcg tablet    Dose/Frequency:  Take 1 tablet (75 mcg total) by mouth daily     Quantity: 100 tablet    [facility] [place] - [facility] [phone]    Local Pharmacy   Does the patient have enough for 3 days?   [] Yes   [] No - Send as HP to POD    Mail Away Pharmacy   Does the patient have enough for 10 days?   [] Yes   [x] No - Send as HP to POD

## 2025-06-09 ENCOUNTER — RESULTS FOLLOW-UP (OUTPATIENT)
Dept: FAMILY MEDICINE CLINIC | Facility: CLINIC | Age: 30
End: 2025-06-09

## 2025-06-13 DIAGNOSIS — F90.9 ADULT ADHD (ATTENTION DEFICIT HYPERACTIVITY DISORDER): ICD-10-CM

## 2025-06-13 RX ORDER — DEXTROAMPHETAMINE SACCHARATE, AMPHETAMINE ASPARTATE, DEXTROAMPHETAMINE SULFATE AND AMPHETAMINE SULFATE 3.75; 3.75; 3.75; 3.75 MG/1; MG/1; MG/1; MG/1
15 TABLET ORAL DAILY
Qty: 30 TABLET | Refills: 0 | Status: SHIPPED | OUTPATIENT
Start: 2025-06-13

## 2025-06-13 NOTE — TELEPHONE ENCOUNTER
Reason for call:   [x] Refill   [] Prior Auth  [] Other:     Office:   [x] PCP/Provider - Nataly  [] Specialty/Provider -     Medication:   Adderall 15 mg, 1 qd, 30    Pharmacy:   [facility]   Does the patient have enough for 3 days?   [] Yes   [] No - Send as HP to POD    Mail Away Pharmacy   Does the patient have enough for 10 days?   [] Yes   [x] No - Send as HP to POD

## 2025-06-13 NOTE — TELEPHONE ENCOUNTER
Last seen 1/22/25  Has appt 10/20/25     1 HASMUKH MARIA 1995 M [id number] [place]      Search Criteria  Name Date of Birth Date Range  Hasmukh Maria 1995 06- To 06-  Requester Name Requested Date  JENNIFER DOYLECLINTON 06- 17:59:46 ([facility])  Summary  Prescriptions  8  Prescribers  2  Pharmacies  1  Drug Classes  Benzodiazepines  0  Stimulants  8  Opioids  0  Muscle Relaxants  0  Opioid Dosage  Total MME for Active Prescriptions  0    Average MME  0.00         Prescriptions  Notifications    Prescribers  Pharmacies  MME Graph    Show All     [place]   Drug Categories:      Stimulants     Show  10  entries  Search:  Patient Id Prescription # Sold Filled Written Drug Label Qty Days Strength MME* Prescriber Pharmacy Payment REFILL #/Auth State Detail  1 [id number] 05/14/2025 05/14/2025 05/12/2025 Amphetamine Salt Combo (Tablet) 30.0 30 15 MG NA CHON) [facility] [place]   1 [id number] 03/09/2025 03/09/2025 03/07/2025 Amphetamine Salt Combo (Tablet) 30.0 30 15 MG NA [facility]O DUSTIN [facility] [place]   1 [id number] 01/24/2025 01/24/2025 01/22/2025 Amphetamine Salt Combo (Tablet) 30.0 30 15 MG NA CHON) [facility] [place]   1 [id number] 01/04/2025 01/04/2025 12/31/2024 Amphetamine Salt Combo (Tablet) 30.0 30 15 MG NA CHON) [facility] [place]   1 [id number] 12/02/2024 12/02/2024 11/27/2024 Amphetamine Salt Combo (Tablet) 30.0 30 15 MG NA CHON) [facility] [place]   1 [id number] 09/24/2024 09/24/2024 09/19/2024 Amphetamine Salt Combo (Tablet) 30.0 30 15 MG NA CHON) [facility] [place]   1 [id number] 08/03/2024 08/03/2024 08/01/2024 Amphetamine Salt Combo (Tablet) 30.0 30 15 MG NA CHON) [facility] [place]   1 [id number] 06/29/2024 06/29/2024 06/25/2024 Amphetamine Salt Combo (Tablet) 30.0 30 15 MG NA CHON) [facility]  [facility] [place] / [place]

## 2025-07-24 DIAGNOSIS — F90.9 ADULT ADHD (ATTENTION DEFICIT HYPERACTIVITY DISORDER): ICD-10-CM

## 2025-07-25 RX ORDER — DEXTROAMPHETAMINE SACCHARATE, AMPHETAMINE ASPARTATE, DEXTROAMPHETAMINE SULFATE AND AMPHETAMINE SULFATE 3.75; 3.75; 3.75; 3.75 MG/1; MG/1; MG/1; MG/1
15 TABLET ORAL DAILY
Qty: 30 TABLET | Refills: 0 | Status: SHIPPED | OUTPATIENT
Start: 2025-07-25

## 2025-07-25 NOTE — TELEPHONE ENCOUNTER
Last seen 1/22/25 and msg left to schedule a visit. I copied past refills into chart from [facility] web site and sent to provider for approval    Name Date of Birth Date Range  denis garrison 1995 07- To 07-  Requ[facility]ed Date  [facility] 07- 11:42[facility]15 ([facility]  Summary  Prescriptions  8  Prescribers  2  Pharmacies  1  Drug Classes  Benzodiazepines  0  Stimulants  8  Opioids  0  Muscle Relaxants  0  Opioid Dosage  Total MME for Active Prescriptions  0    Average MME  0.00         Prescriptions  Notifications    Prescribers  Pharmacies  MME Graph    Show All     [place]   Drug Categories:      Stimulants     Show  10  entries  Search:  Patient Id Prescription # Sold Filled Written Drug Label Qty Days Strength MME* Prescriber Pharmacy Payment REFILL #/Auth State Detail  1 [id number] 06/18/2025 06/18/2025 06/13/2025 Amphetamine Salt Combo (Tablet) 30.0 30 15 MG NA CHON) [facility] [place]   1 [id number] 05/14/2025 05/14/2025 05/12/2025 Amphetamine Salt Combo (Tablet) 30.0 30 15 MG NA CHON) [facility] [place]   1 [id number] 03/09/2025 03/09/2025 03/07/2025 Amphetamine Salt Combo (Tablet) 30.0 30 15 MG NA [facility]O BEL[facility]NIN [facility] [place]   1 [id number] 01/24/2025 01/24/2025 01/22/2025 Amphetamine Salt Combo (Tablet) 30.0 30 15 MG NA CHON) [facility] [place]   1 [id number] 01/04/2025 01/04/2025 12/31/2024 Amphetamine Salt Combo (Tablet) 30.0 30 15 MG NA CHON) [facility] [place]   1 [id number] 12/02/2024 12/02/2024 11/27/2024 Amphetamine Salt Combo (Tablet) 30.0 30 15 MG NA CHON) [facility] [place]   1 [id number] 09/24/2024 09/24/2024 09/19/2024 Amphetamine Salt Combo (Tablet) 30.0 30 15 MG NA CHON) [facility] [place]   1 [id number] 08/03/2024 08/03/2024 08/01/2024 Amphetamine Salt Combo (Tablet) 30.0 30 15 MG NA CHON) [facility]  [facility] [place]   Showing 1 to 8 of 8 [id number]    * Per CDC guidance, the conversion factors and associated daily morphine milligram equivalents for drugs prescribed as part of medication-assisted treatment for opioid use disorder should not be used to benchmark against dosage thresholds meant for opioids prescribed for pain.    Report Disclaimer:    ** The [place] Prescription Drug Monitoring Program (PDMP) collects information about controlled substance prescription drugs that are dispensed to patients within the [facility]. The information in the PDMP database is submitted by pharmacies and may contain errors and omissions. This includes but is not limited to the wrong Date Sold being reported to the PDMP or the Date Sold being reported in an incorrect format. Independent verification of prescription information with pharmacies and prescribers may sometimes be prudent or necessary.    Information from the [place] Prescription Drug Monitoring Program (PDMP) database is protected health information and any information accessed must be treated as confidential. Any person who knowingly or intentionally makes an unauthorized disclosure of information from the PDMP database will be subject to civil and criminal penalties as set forth in the ABC-MAP Act 191 of 2014; Act of Oct. 27, 2014, P.L. 2911, No. 191.    The information in the PDMP database is submitted by pharmacies and may contain errors and omissions. Additionally, pharmacies may submit extraneous non-controlled substance dispensations to the PDMP database; if a non-controlled substance is present on one patient’s Prescription Report, it should not be assumed that this same medication will be reported on another patient’s Prescription Report. Independent verification of prescription information with pharmacies and prescribers may sometimes be prudent or necessary.    Educational content  CDC MME calculation guidelines  [place]  Prescribing Guidelines  Letter Regarding the Misapplication of Prescribing Guidelines  Guide for Appropriate Tapering or Discontinuation of Long-Term Opioid Use  #[id number]